# Patient Record
Sex: FEMALE | Race: BLACK OR AFRICAN AMERICAN | NOT HISPANIC OR LATINO | Employment: FULL TIME | URBAN - METROPOLITAN AREA
[De-identification: names, ages, dates, MRNs, and addresses within clinical notes are randomized per-mention and may not be internally consistent; named-entity substitution may affect disease eponyms.]

---

## 2017-01-17 ENCOUNTER — HOSPITAL ENCOUNTER (OUTPATIENT)
Dept: RADIOLOGY | Facility: HOSPITAL | Age: 57
Discharge: HOME | End: 2017-01-17
Payer: COMMERCIAL

## 2017-01-17 DIAGNOSIS — Z12.31 ENCOUNTER FOR SCREENING MAMMOGRAM FOR MALIGNANT NEOPLASM OF BREAST: ICD-10-CM

## 2017-01-17 PROCEDURE — G0202 SCR MAMMO BI INCL CAD: HCPCS

## 2017-03-15 ENCOUNTER — APPOINTMENT (EMERGENCY)
Dept: RADIOLOGY | Facility: HOSPITAL | Age: 57
End: 2017-03-15
Payer: COMMERCIAL

## 2017-03-15 ENCOUNTER — HOSPITAL ENCOUNTER (EMERGENCY)
Facility: HOSPITAL | Age: 57
Discharge: HOME | End: 2017-03-15
Admitting: EMERGENCY MEDICINE
Payer: COMMERCIAL

## 2017-03-15 VITALS
RESPIRATION RATE: 18 BRPM | DIASTOLIC BLOOD PRESSURE: 86 MMHG | WEIGHT: 195 LBS | TEMPERATURE: 99 F | OXYGEN SATURATION: 99 % | HEART RATE: 76 BPM | SYSTOLIC BLOOD PRESSURE: 176 MMHG

## 2017-03-15 DIAGNOSIS — S62.609A FINGER FRACTURE, LEFT, CLOSED, INITIAL ENCOUNTER: Primary | ICD-10-CM

## 2017-03-15 PROCEDURE — 73140 X-RAY EXAM OF FINGER(S): CPT

## 2017-03-15 PROCEDURE — 99283 EMERGENCY DEPT VISIT LOW MDM: CPT

## 2017-03-15 PROCEDURE — A9270 NON-COVERED ITEM OR SERVICE: HCPCS | Performed by: PHYSICIAN ASSISTANT

## 2017-03-15 RX ORDER — IBUPROFEN 400 MG/1
400 TABLET ORAL ONCE
Status: COMPLETED | OUTPATIENT
Start: 2017-03-15 | End: 2017-03-15

## 2017-03-15 RX ADMIN — IBUPROFEN 400 MG: 400 TABLET ORAL at 13:14

## 2017-10-25 ENCOUNTER — TRANSCRIBE ORDERS (OUTPATIENT)
Dept: ADMINISTRATIVE | Facility: HOSPITAL | Age: 57
End: 2017-10-25

## 2017-10-25 DIAGNOSIS — G89.29 CHRONIC PAIN OF RIGHT KNEE: Primary | ICD-10-CM

## 2017-10-25 DIAGNOSIS — G89.29 CHRONIC PAIN OF BOTH KNEES: ICD-10-CM

## 2017-10-25 DIAGNOSIS — M25.561 CHRONIC PAIN OF BOTH KNEES: ICD-10-CM

## 2017-10-25 DIAGNOSIS — M25.562 CHRONIC PAIN OF BOTH KNEES: ICD-10-CM

## 2017-10-25 DIAGNOSIS — M25.561 CHRONIC PAIN OF RIGHT KNEE: Primary | ICD-10-CM

## 2018-03-19 ENCOUNTER — TRANSCRIBE ORDERS (OUTPATIENT)
Dept: ADMINISTRATIVE | Facility: HOSPITAL | Age: 58
End: 2018-03-19

## 2018-03-19 DIAGNOSIS — Z12.31 ENCOUNTER FOR SCREENING MAMMOGRAM FOR MALIGNANT NEOPLASM OF BREAST: Primary | ICD-10-CM

## 2018-03-26 ENCOUNTER — HOSPITAL ENCOUNTER (OUTPATIENT)
Dept: RADIOLOGY | Facility: HOSPITAL | Age: 58
Discharge: HOME | End: 2018-03-26
Payer: COMMERCIAL

## 2018-03-26 DIAGNOSIS — Z12.31 ENCOUNTER FOR SCREENING MAMMOGRAM FOR MALIGNANT NEOPLASM OF BREAST: ICD-10-CM

## 2018-03-26 PROCEDURE — 77067 SCR MAMMO BI INCL CAD: CPT

## 2018-04-16 ENCOUNTER — HOSPITAL ENCOUNTER (OUTPATIENT)
Dept: RADIOLOGY | Facility: HOSPITAL | Age: 58
Discharge: HOME | End: 2018-04-16
Payer: COMMERCIAL

## 2018-04-16 ENCOUNTER — TRANSCRIBE ORDERS (OUTPATIENT)
Dept: ADMINISTRATIVE | Facility: HOSPITAL | Age: 58
End: 2018-04-16

## 2018-04-16 DIAGNOSIS — R07.89 OTHER CHEST PAIN: Primary | ICD-10-CM

## 2018-04-16 DIAGNOSIS — R07.89 OTHER CHEST PAIN: ICD-10-CM

## 2018-04-16 PROCEDURE — 71046 X-RAY EXAM CHEST 2 VIEWS: CPT

## 2019-03-19 ENCOUNTER — TRANSCRIBE ORDERS (OUTPATIENT)
Dept: ADMINISTRATIVE | Facility: HOSPITAL | Age: 59
End: 2019-03-19

## 2019-03-19 DIAGNOSIS — Z12.39 SCREENING BREAST EXAMINATION: Primary | ICD-10-CM

## 2019-04-04 ENCOUNTER — HOSPITAL ENCOUNTER (OUTPATIENT)
Dept: RADIOLOGY | Facility: HOSPITAL | Age: 59
Discharge: HOME | End: 2019-04-04
Payer: COMMERCIAL

## 2019-04-04 VITALS — BODY MASS INDEX: 31.58 KG/M2 | HEIGHT: 64 IN | WEIGHT: 185 LBS

## 2019-04-04 DIAGNOSIS — Z12.39 SCREENING BREAST EXAMINATION: ICD-10-CM

## 2019-04-04 PROCEDURE — 77067 SCR MAMMO BI INCL CAD: CPT

## 2019-06-22 LAB — HBA1C MFR BLD HPLC: 8.1 %

## 2019-09-09 RX ORDER — SIMVASTATIN 80 MG
TABLET ORAL
COMMUNITY
End: 2019-12-10

## 2019-09-09 RX ORDER — LISINOPRIL 30 MG/1
TABLET ORAL
COMMUNITY

## 2019-09-09 RX ORDER — IBUPROFEN 800 MG/1
TABLET ORAL AS NEEDED
COMMUNITY

## 2019-09-10 ENCOUNTER — TELEPHONE (OUTPATIENT)
Dept: ENDOCRINOLOGY | Facility: CLINIC | Age: 59
End: 2019-09-10

## 2019-09-10 NOTE — TELEPHONE ENCOUNTER
Called patient and requested she contact her primary care physician for chart notes or recent labs    Anything that can tell us of the previous care she has been receiving

## 2019-09-11 RX ORDER — RABEPRAZOLE SODIUM 20 MG/1
20 TABLET, DELAYED RELEASE ORAL DAILY
Refills: 0 | COMMUNITY
Start: 2019-09-09 | End: 2019-12-10

## 2019-09-11 RX ORDER — LISINOPRIL 20 MG/1
20 TABLET ORAL DAILY
Refills: 0 | COMMUNITY
Start: 2019-09-09

## 2019-09-11 RX ORDER — SIMVASTATIN 20 MG
TABLET ORAL
Refills: 0 | COMMUNITY
Start: 2019-09-07 | End: 2020-05-13

## 2019-09-11 RX ORDER — SITAGLIPTIN 100 MG/1
TABLET, FILM COATED ORAL
Refills: 0 | COMMUNITY
Start: 2019-09-07 | End: 2019-10-15 | Stop reason: SDUPTHER

## 2019-09-11 RX ORDER — FLUCONAZOLE 150 MG/1
150 TABLET ORAL DAILY
Refills: 0 | COMMUNITY
Start: 2019-06-18

## 2019-09-11 RX ORDER — INSULIN GLARGINE 100 [IU]/ML
INJECTION, SOLUTION SUBCUTANEOUS
Refills: 0 | COMMUNITY
Start: 2019-08-30 | End: 2019-09-13

## 2019-09-11 RX ORDER — BUPROPION HYDROCHLORIDE 150 MG/1
150 TABLET, EXTENDED RELEASE ORAL DAILY
Refills: 2 | COMMUNITY
Start: 2019-07-24

## 2019-09-11 RX ORDER — FLUCONAZOLE 200 MG/1
TABLET ORAL
Refills: 0 | COMMUNITY
Start: 2019-09-05

## 2019-09-13 ENCOUNTER — OFFICE VISIT (OUTPATIENT)
Dept: ENDOCRINOLOGY | Facility: CLINIC | Age: 59
End: 2019-09-13
Payer: COMMERCIAL

## 2019-09-13 VITALS
BODY MASS INDEX: 31.76 KG/M2 | HEART RATE: 69 BPM | SYSTOLIC BLOOD PRESSURE: 118 MMHG | DIASTOLIC BLOOD PRESSURE: 72 MMHG | WEIGHT: 186 LBS | HEIGHT: 64 IN

## 2019-09-13 DIAGNOSIS — E55.9 VITAMIN D DEFICIENCY: ICD-10-CM

## 2019-09-13 DIAGNOSIS — I10 HYPERTENSION GOAL BP (BLOOD PRESSURE) < 140/90: ICD-10-CM

## 2019-09-13 DIAGNOSIS — E11.9 TYPE 2 DIABETES MELLITUS WITHOUT COMPLICATION, WITH LONG-TERM CURRENT USE OF INSULIN (HCC): Primary | ICD-10-CM

## 2019-09-13 DIAGNOSIS — E05.90 SUBCLINICAL HYPERTHYROIDISM: ICD-10-CM

## 2019-09-13 DIAGNOSIS — Z79.4 TYPE 2 DIABETES MELLITUS WITHOUT COMPLICATION, WITH LONG-TERM CURRENT USE OF INSULIN (HCC): Primary | ICD-10-CM

## 2019-09-13 DIAGNOSIS — E78.00 PURE HYPERCHOLESTEROLEMIA: ICD-10-CM

## 2019-09-13 PROCEDURE — 99204 OFFICE O/P NEW MOD 45 MIN: CPT | Performed by: INTERNAL MEDICINE

## 2019-09-13 RX ORDER — ESTRADIOL 4 UG/1
INSERT VAGINAL
COMMUNITY
Start: 2019-09-09

## 2019-09-13 RX ORDER — GLIMEPIRIDE 2 MG/1
TABLET ORAL
Qty: 270 TABLET | Refills: 3 | Status: SHIPPED | OUTPATIENT
Start: 2019-09-13 | End: 2019-10-17 | Stop reason: SDUPTHER

## 2019-09-13 RX ORDER — MELATONIN
1000 DAILY
Qty: 90 TABLET | Refills: 3 | Status: SHIPPED | OUTPATIENT
Start: 2019-09-13 | End: 2020-09-25

## 2019-09-13 NOTE — PROGRESS NOTES
ENDOCRINOLOGY  NEW PATIENT H&P     ? Reason for Endocrine Consult/Chief Complaint: DM management          Medical Decision Making:     Impression  1  DM2 uncontrolled  2  HTN  3  HLD   4  Vitamin D deficiency  5  Low TSH    Recommendations:  ?  I discussed the pathophysiology of DM2 and went over therapy options based on ADA 2019 guidelines  Her A1C is 8% and close to getting at goal of <7%  She presently is on basal insulin along with a sulfonylurea and DPP-4 inhibitor  Her fasting BGs are mildly elevated but she is having some post-prandial spikes into the 200s especially after dinner which is her largest meal   I discussed use of GLP-1 agonists however she has had nausea before and stomach issues and does not want to pursue that therapy  I increased her lantus in AM to 23 units to improve her FBG numbers and also increased her glimepiride to 2mg qAM with breakfast and 4mg qPM with dinner to diminish post-prandial spikes in BG after dinner  Will check c-peptide to assess for endogenous insulin production  Instructed to send me BG log in 2 weeks for review and to notify me if BGs are running on lo side 70-90 or she has an episode of low BG <70 so I can adjust therapy  Referred to [facility] CDE for further diabetes education  HTN- controlled on ACEi    HLD- continue simvastatin, repeat lipids now to assess for whether she needs higher potency statin    Vitamin D deficiency- start D3 replacement 1000 IU daily, repeat level now    Low TSH- subclinical hyperthyroidism on labs in June 2019, TSH mildly low, will repeat TFTs to assess if persistent, has had some weight loss recently     Elevated WBC and Hgb- on labs in June 2019, had UTI at that time now resolved, repeat CBC w/diff to see if persistent     RTC in 4 weeks     Josephine ARANGO  History of Present Illness:   Mrs Gill Bains is a 54yr old female who presents for DM management  She had most recent labs in June 2019    She had elevated WBC and Hgb as well as A1C of 8 1% and low TSH of 0 444, normal FT4  Her Vit D level was low as well at 23 9  GFR of 94  PMH-DM2, HLD, HTN, vitamin D deficiency   PSH-shoulder surgery   FHx-DM in brothers and sister   SHx-+smoking 1ppd, social ETOH use, retired [employer] supervisor      Type of DM: 2  Age of onset:6 years hospitalized at [facility]   Most recent A1C:8 1% June 2019  Present home regimen: Januvia 100mg qday, lantus 20 units qAM, glimepiride 2mg BID AC --- had metformin in the past but had SE of loose BMs and nausea   SMBG at home: FBG in low to mid 100s and prelunch in high 100s/200s, 2hrs after lunch in same range and pre-dinner in high 100s and qHS in high 100s and 200s   Hypoglycemic events: none   Microvascular complications:none known   Macrovascular complications: none known   Nutrition: 3 meals a day, breakfast grits with cheese, lunch white castle fish sandwich with fries, dinner portion yellow rice and string beans and meat, some juices/sodas   Exercise: walking at park      ? Review of Systems:     Review of Systems   Constitutional: Positive for appetite change  HENT: Negative  Eyes: Negative  Respiratory: Negative  Cardiovascular: Negative  Gastrointestinal: Negative  Endocrine: Negative  Genitourinary: Negative  Musculoskeletal: Negative  Skin: Negative  Allergic/Immunologic: Negative  Neurological: Negative  Hematological: Negative  Psychiatric/Behavioral: Negative  ? Patient History:     Past Medical History:   Diagnosis Date    Diabetes mellitus ([address] )     Hyperlipidemia     Hypertension      No past surgical history on file  Social History     Socioeconomic History    Marital status:       Spouse name: Not on file    Number of children: Not on file    Years of education: Not on file    Highest education level: Not on file   Occupational History    Not on file   Social Needs    Financial resource strain: Not on file    Food insecurity:     Worry: Not on file     Inability: Not on file    Transportation needs:     Medical: Not on file     Non-medical: Not on file   Tobacco Use    Smoking status: Current Every Day Smoker     Packs/day: 0 50     Types: Cigarettes   Substance and Sexual Activity    Alcohol use: Yes     Comment: socially    Drug use: No    Sexual activity: Not on file   Lifestyle    Physical activity:     Days per week: Not on file     Minutes per session: Not on file    Stress: Not on file   Relationships    Social connections:     Talks on phone: Not on file     Gets together: Not on file     Attends Cheondoism service: Not on file     Active member of club or organization: Not on file     Attends meetings of clubs or organizations: Not on file     Relationship status: Not on file    Intimate partner violence:     Fear of current or ex partner: Not on file     Emotionally abused: Not on file     Physically abused: Not on file     Forced sexual activity: Not on file   Other Topics Concern    Not on file   Social History Narrative    Not on file     Family History   Problem Relation Age of Onset    Lung cancer Father 52    Endometrial cancer Sister 61       Current Medications: At the time this note was written these were the medications the patient was on    Current Outpatient Medications   Medication Sig Dispense Refill    buPROPion (ZYBAN) 150 MG 12 hr tablet Take 150 mg by mouth daily  2    fluconazole (DIFLUCAN) 150 mg tablet Take 150 mg by mouth daily  0    fluconazole (DIFLUCAN) 200 mg tablet TAKE 1 TABLET BY MOUTH ONE TIME  0    ibuprofen (MOTRIN) 800 mg tablet Take by mouth      IMVEXXY MAINTENANCE PACK 4 MCG INST       JANUVIA 100 MG tablet   0    LANTUS SOLOSTAR 100 units/mL injection pen INJECT 20 UNITS SUBQ ONCE A DAY  0    lisinopril (ZESTRIL) 20 mg tablet Take 20 mg by mouth daily  0    lisinopril (ZESTRIL) 30 mg tablet Take by mouth      LISINOPRIL PO Take by mouth      PROAIR  (90 Base) MCG/ACT inhaler Inhale 2 puffs 2 (two) times a day  2    RABEprazole (ACIPHEX) 20 MG tablet Take 20 mg by mouth daily  0    simvastatin (ZOCOR) 20 mg tablet   0    simvastatin (ZOCOR) 80 mg tablet Take by mouth      SIMVASTATIN PO Take by mouth      SITagliptin Phosphate (JANUVIA PO) Take by mouth      SITagliptin-metFORMIN HCl ER (JANUMET XR) 100-1000 MG TB24 Take by mouth       No current facility-administered medications for this visit  Allergies: Patient has no known allergies  Physical Exam:   Vital Signs:   /72   Pulse 69   Ht 5' 4" (1 626 m)   Wt 84 4 kg (186 lb)   BMI 31 93 kg/m²     Physical Exam   Constitutional: She is oriented to person, place, and time  She appears well-developed and well-nourished  HENT:   Head: Normocephalic and atraumatic  Nose: Nose normal    Mouth/Throat: Oropharynx is clear and moist  No oropharyngeal exudate  Eyes: Pupils are equal, round, and reactive to light  Conjunctivae and EOM are normal    Neck: Normal range of motion  Neck supple  No thyromegaly present  Cardiovascular: Normal rate, regular rhythm and normal heart sounds  Exam reveals no gallop and no friction rub  No murmur heard  Pulmonary/Chest: Effort normal and breath sounds normal  No stridor  No respiratory distress  She has no wheezes  She has no rales  Abdominal: Soft  Bowel sounds are normal  She exhibits no distension and no mass  There is no tenderness  There is no rebound and no guarding  Musculoskeletal: Normal range of motion  She exhibits no edema  Lymphadenopathy:     She has no cervical adenopathy  Neurological: She is alert and oriented to person, place, and time  Skin: Skin is warm and dry  No rash noted  No erythema  Psychiatric: She has a normal mood and affect   Her behavior is normal  Judgment and thought content normal           Labs and Imaging:      ?labs from June 2019 scanned into media

## 2019-09-13 NOTE — PATIENT INSTRUCTIONS
Increase lantus to 23 units in the morning  Continue Januvia 100mg qday  Increase glimepiride to 2mg with breakfast and 4mg with dinner    Have labs done next week, I will call with the results    Send me blood sugar log in 2 weeks    Meet with Gigi Barriga our diabetes educator    Follow up in 4 weeks

## 2019-09-13 NOTE — LETTER
September 13, 2019     Emeli Szymanski MD  [facility]    Patient: Danny Wan   YOB: 1960   Date of Visit: 9/13/2019       Dear Dr Timmy Perez: Thank you for referring Danny Wan to me for evaluation  Below are my notes for this consultation  If you have questions, please do not hesitate to call me  I look forward to following your patient along with you  Sincerely,        Nohemi Lazcano MD        CC: No Recipients  Nohemi Lazcano MD  9/13/2019 10:04 AM  Incomplete  ENDOCRINOLOGY  NEW PATIENT H&P     ? Reason for Endocrine Consult/Chief Complaint: DM management          Medical Decision Making:     Impression  1  DM2 uncontrolled  2  HTN  3  HLD   4  Vitamin D deficiency  5  Low TSH    Recommendations:  ?  I discussed the pathophysiology of DM2 and went over therapy options based on ADA 2019 guidelines  Her A1C is 8% and close to getting at goal of <7%  She presently is on basal insulin along with a sulfonylurea and DPP-4 inhibitor  Her fasting BGs are mildly elevated but she is having some post-prandial spikes into the 200s especially after dinner which is her largest meal   I discussed use of GLP-1 agonists however she has had nausea before and stomach issues and does not want to pursue that therapy  I increased her lantus in AM to 23 units to improve her FBG numbers and also increased her glimepiride to 2mg qAM with breakfast and 4mg qPM with dinner to diminish post-prandial spikes in BG after dinner  Will check c-peptide to assess for endogenous insulin production  Instructed to send me BG log in 2 weeks for review and to notify me if BGs are running on lo side 70-90 or she has an episode of low BG <70 so I can adjust therapy  Referred to [facility] CDE for further diabetes education       HTN- controlled on ACEi    HLD- continue simvastatin, repeat lipids now to assess for whether she needs higher potency statin    Vitamin D deficiency- start D3 replacement 1000 IU daily, repeat level now    Low TSH- subclinical hyperthyroidism on labs in June 2019, TSH mildly low, will repeat TFTs to assess if persistent, has had some weight loss recently     Elevated WBC and Hgb- on labs in June 2019, had UTI at that time now resolved, repeat CBC w/diff to see if persistent     RTC in 4 weeks     Oscar ARANGO  History of Present Illness:    Mrs Joe Birmingham is a 54yr old female who presents for DM management  She had most recent labs in June 2019  She had elevated WBC and Hgb as well as A1C of 8 1% and low TSH of 0 444, normal FT4  Her Vit D level was low as well at 23 9  GFR of 94  PMH-DM2, HLD, HTN, vitamin D deficiency   PSH-shoulder surgery   FHx-DM in brothers and sister   SHx-+smoking 1ppd, social ETOH use, retired [employer] supervisor      Type of DM: 2  Age of onset:6 years hospitalized at [facility]   Most recent A1C:8 1% June 2019  Present home regimen: Januvia 100mg qday, lantus 20 units qAM, glimepiride 2mg BID AC --- had metformin in the past but had SE of loose BMs and nausea   SMBG at home: FBG in low to mid 100s and prelunch in high 100s/200s, 2hrs after lunch in same range and pre-dinner in high 100s and qHS in high 100s and 200s   Hypoglycemic events: none   Microvascular complications:none known   Macrovascular complications: none known   Nutrition: 3 meals a day, breakfast grits with cheese, lunch white castle fish sandwich with fries, dinner portion yellow rice and string beans and meat, some juices/sodas   Exercise: walking at park      ? Review of Systems:     Review of Systems   Constitutional: Positive for appetite change  HENT: Negative  Eyes: Negative  Respiratory: Negative  Cardiovascular: Negative  Gastrointestinal: Negative  Endocrine: Negative  Genitourinary: Negative  Musculoskeletal: Negative  Skin: Negative  Allergic/Immunologic: Negative  Neurological: Negative  Hematological: Negative  Psychiatric/Behavioral: Negative  ? Patient History:     Past Medical History:   Diagnosis Date    Diabetes mellitus [address] [address] )     Hyperlipidemia     Hypertension      No past surgical history on file  Social History     Socioeconomic History    Marital status:      Spouse name: Not on file    Number of children: Not on file    Years of education: Not on file    Highest education level: Not on file   Occupational History    Not on file   Social Needs    Financial resource strain: Not on file    Food insecurity:     Worry: Not on file     Inability: Not on file    Transportation needs:     Medical: Not on file     Non-medical: Not on file   Tobacco Use    Smoking status: Current Every Day Smoker     Packs/day: 0 50     Types: Cigarettes   Substance and Sexual Activity    Alcohol use: Yes     Comment: socially    Drug use: No    Sexual activity: Not on file   Lifestyle    Physical activity:     Days per week: Not on file     Minutes per session: Not on file    Stress: Not on file   Relationships    Social connections:     Talks on phone: Not on file     Gets together: Not on file     Attends Hoahaoism service: Not on file     Active member of club or organization: Not on file     Attends meetings of clubs or organizations: Not on file     Relationship status: Not on file    Intimate partner violence:     Fear of current or ex partner: Not on file     Emotionally abused: Not on file     Physically abused: Not on file     Forced sexual activity: Not on file   Other Topics Concern    Not on file   Social History Narrative    Not on file     Family History   Problem Relation Age of Onset    Lung cancer Father 52    Endometrial cancer Sister 61       Current Medications: At the time this note was written these were the medications the patient was on    Current Outpatient Medications   Medication Sig Dispense Refill    buPROPion (ZYBAN) 150 MG 12 hr tablet Take 150 mg by mouth daily  2    fluconazole (DIFLUCAN) 150 mg tablet Take 150 mg by mouth daily  0    fluconazole (DIFLUCAN) 200 mg tablet TAKE 1 TABLET BY MOUTH ONE TIME  0    ibuprofen (MOTRIN) 800 mg tablet Take by mouth      IMVEXXY MAINTENANCE PACK 4 MCG INST       JANUVIA 100 MG tablet   0    LANTUS SOLOSTAR 100 units/mL injection pen INJECT 20 UNITS SUBQ ONCE A DAY  0    lisinopril (ZESTRIL) 20 mg tablet Take 20 mg by mouth daily  0    lisinopril (ZESTRIL) 30 mg tablet Take by mouth      LISINOPRIL PO Take by mouth      PROAIR  (90 Base) MCG/ACT inhaler Inhale 2 puffs 2 (two) times a day  2    RABEprazole (ACIPHEX) 20 MG tablet Take 20 mg by mouth daily  0    simvastatin (ZOCOR) 20 mg tablet   0    simvastatin (ZOCOR) 80 mg tablet Take by mouth      SIMVASTATIN PO Take by mouth      SITagliptin Phosphate (JANUVIA PO) Take by mouth      SITagliptin-metFORMIN HCl ER (JANUMET XR) 100-1000 MG TB24 Take by mouth       No current facility-administered medications for this visit  Allergies: Patient has no known allergies  Physical Exam:   Vital Signs:   /72   Pulse 69   Ht 5' 4" (1 626 m)   Wt 84 4 kg (186 lb)   BMI 31 93 kg/m²      Physical Exam   Constitutional: She is oriented to person, place, and time  She appears well-developed and well-nourished  HENT:   Head: Normocephalic and atraumatic  Nose: Nose normal    Mouth/Throat: Oropharynx is clear and moist  No oropharyngeal exudate  Eyes: Pupils are equal, round, and reactive to light  Conjunctivae and EOM are normal    Neck: Normal range of motion  Neck supple  No thyromegaly present  Cardiovascular: Normal rate, regular rhythm and normal heart sounds  Exam reveals no gallop and no friction rub  No murmur heard  Pulmonary/Chest: Effort normal and breath sounds normal  No stridor  No respiratory distress  She has no wheezes  She has no rales  Abdominal: Soft   Bowel sounds are normal  She exhibits no distension and no mass  There is no tenderness  There is no rebound and no guarding  Musculoskeletal: Normal range of motion  She exhibits no edema  Lymphadenopathy:     She has no cervical adenopathy  Neurological: She is alert and oriented to person, place, and time  Skin: Skin is warm and dry  No rash noted  No erythema  Psychiatric: She has a normal mood and affect  Her behavior is normal  Judgment and thought content normal           Labs and Imaging:      ?labs from June 2019 scanned into Yair Lowry MD  9/13/2019  9:50 AM  Incomplete  ENDOCRINOLOGY  NEW PATIENT H&P     ? Reason for Endocrine Consult/Chief Complaint: DM management          Medical Decision Making:     Impression  1  DM2 uncontrolled  2  HTN  3  HLD       Recommendations:  ?      Ashley ARANGO  History of Present Illness:    Mrs Rivka Amador is a 54yr old female who presents for DM management  She had most recent labs in June 2019  She had elevated WBC and Hgb as well as A1C of 8 1% and low TSH of 0 444, normal FT4  Her Vit D level was low as well at 23 9  GFR of 94       PMH-DM2, HLD, HTN, vitamin D deficiency   PSH-shoulder surgery   FHx-DM in brothers and sister   SHx-+smoking 1ppd, social ETOH use, retired [employer] supervisor      Type of DM: 2  Age of onset:6 years hospitalized at [facility]   Most recent A1C:8 1% June 2019  Present home regimen: Januvia 100mg qday, lantus 20 units qAM, glimepiride 2mg BID AC --- had metformin in the past but had SE of loose BMs and nausea   SMBG at home: FBG in low to mid 100s and prelunch in high 100s/200s, 2hrs after lunch in same range and pre-dinner in high 100s and qHS in high 100s and 200s   Hypoglycemic events: none   Microvascular complications:none known   Macrovascular complications: none known   Nutrition: 3 meals a day, breakfast grits with cheese, lunch white castle fish sandwich with fries, dinner portion yellow rice and string beans and meat, some juices/sodas   Exercise: walking at park      ? Review of Systems:     Review of Systems   ? Patient History:     Past Medical History:   Diagnosis Date    Diabetes mellitus [address] [address]     Hyperlipidemia     Hypertension      No past surgical history on file  Social History     Socioeconomic History    Marital status:      Spouse name: Not on file    Number of children: Not on file    Years of education: Not on file    Highest education level: Not on file   Occupational History    Not on file   Social Needs    Financial resource strain: Not on file    Food insecurity:     Worry: Not on file     Inability: Not on file    Transportation needs:     Medical: Not on file     Non-medical: Not on file   Tobacco Use    Smoking status: Current Every Day Smoker     Packs/day: 0 50     Types: Cigarettes   Substance and Sexual Activity    Alcohol use: Yes     Comment: socially    Drug use: No    Sexual activity: Not on file   Lifestyle    Physical activity:     Days per week: Not on file     Minutes per session: Not on file    Stress: Not on file   Relationships    Social connections:     Talks on phone: Not on file     Gets together: Not on file     Attends [employer] service: Not on file     Active member of club or organization: Not on file     Attends meetings of clubs or organizations: Not on file     Relationship status: Not on file    Intimate partner violence:     Fear of current or ex partner: Not on file     Emotionally abused: Not on file     Physically abused: Not on file     Forced sexual activity: Not on file   Other Topics Concern    Not on file   Social History Narrative    Not on file     Family History   Problem Relation Age of Onset    Lung cancer Father 52    Endometrial cancer Sister 61       Current Medications: At the time this note was written these were the medications the patient was on    Current Outpatient Medications   Medication Sig Dispense Refill    buPROPion (ZYBAN) 150 MG 12 hr tablet Take 150 mg by mouth daily  2    fluconazole (DIFLUCAN) 150 mg tablet Take 150 mg by mouth daily  0    fluconazole (DIFLUCAN) 200 mg tablet TAKE 1 TABLET BY MOUTH ONE TIME  0    ibuprofen (MOTRIN) 800 mg tablet Take by mouth      IMVEXXY MAINTENANCE PACK 4 MCG INST       JANUVIA 100 MG tablet   0    LANTUS SOLOSTAR 100 units/mL injection pen INJECT 20 UNITS SUBQ ONCE A DAY  0    lisinopril (ZESTRIL) 20 mg tablet Take 20 mg by mouth daily  0    lisinopril (ZESTRIL) 30 mg tablet Take by mouth      LISINOPRIL PO Take by mouth      PROAIR  (90 Base) MCG/ACT inhaler Inhale 2 puffs 2 (two) times a day  2    RABEprazole (ACIPHEX) 20 MG tablet Take 20 mg by mouth daily  0    simvastatin (ZOCOR) 20 mg tablet   0    simvastatin (ZOCOR) 80 mg tablet Take by mouth      SIMVASTATIN PO Take by mouth      SITagliptin Phosphate (JANUVIA PO) Take by mouth      SITagliptin-metFORMIN HCl ER (JANUMET XR) 100-1000 MG TB24 Take by mouth       No current facility-administered medications for this visit  Allergies: Patient has no known allergies      Physical Exam:   Vital Signs:   /72   Pulse 69   Ht 5' 4" (1 626 m)   Wt 84 4 kg (186 lb)   BMI 31 93 kg/m²      Physical Exam       Labs and Imaging:      ?labs from June 2019 scanned into media

## 2019-09-18 LAB
CREAT ?TM UR-SCNC: 104.5 UMOL/L
EXT MICROALBUMIN URINE RANDOM: 13.5
HBA1C MFR BLD HPLC: 10.1 %
MICROALBUMIN/CREAT UR: 12.9 MG/G{CREAT}

## 2019-09-20 LAB
25(OH)D3+25(OH)D2 SERPL-MCNC: 25.2 NG/ML (ref 30–100)
ALBUMIN SERPL-MCNC: 4.1 G/DL (ref 3.6–4.8)
ALBUMIN/CREAT UR: 12.9 MG/G CREAT (ref 0–30)
ALBUMIN/GLOB SERPL: 1.7 {RATIO} (ref 1.2–2.2)
ALP SERPL-CCNC: 70 IU/L (ref 39–117)
ALT SERPL-CCNC: 15 IU/L (ref 0–32)
AST SERPL-CCNC: 15 IU/L (ref 0–40)
BASOPHILS # BLD AUTO: 0 X10E3/UL (ref 0–0.2)
BASOPHILS NFR BLD AUTO: 0 %
BILIRUB SERPL-MCNC: 0.5 MG/DL (ref 0–1.2)
BUN SERPL-MCNC: 9 MG/DL (ref 8–27)
BUN/CREAT SERPL: 12 (ref 12–28)
C PEPTIDE SERPL-MCNC: 1.9 NG/ML (ref 1.1–4.4)
CALCIUM SERPL-MCNC: 9.6 MG/DL (ref 8.7–10.3)
CHLORIDE SERPL-SCNC: 106 MMOL/L (ref 96–106)
CHOLEST SERPL-MCNC: 183 MG/DL (ref 100–199)
CO2 SERPL-SCNC: 23 MMOL/L (ref 20–29)
CREAT SERPL-MCNC: 0.73 MG/DL (ref 0.57–1)
CREAT UR-MCNC: 104.5 MG/DL
EOSINOPHIL # BLD AUTO: 0.2 X10E3/UL (ref 0–0.4)
EOSINOPHIL NFR BLD AUTO: 2 %
ERYTHROCYTE [DISTWIDTH] IN BLOOD BY AUTOMATED COUNT: 14.4 % (ref 12.3–15.4)
GAD65 AB SER-ACNC: <5 U/ML (ref 0–5)
GLOBULIN SER-MCNC: 2.4 G/DL (ref 1.5–4.5)
GLUCOSE SERPL-MCNC: 98 MG/DL (ref 65–99)
HBA1C MFR BLD: 10.1 % (ref 4.8–5.6)
HCT VFR BLD AUTO: 44.9 % (ref 34–46.6)
HDLC SERPL-MCNC: 37 MG/DL
HGB BLD-MCNC: 15.2 G/DL (ref 11.1–15.9)
IMM GRANULOCYTES # BLD: 0 X10E3/UL (ref 0–0.1)
IMM GRANULOCYTES NFR BLD: 0 %
LDLC SERPL CALC-MCNC: 125 MG/DL (ref 0–99)
LYMPHOCYTES # BLD AUTO: 2.7 X10E3/UL (ref 0.7–3.1)
LYMPHOCYTES NFR BLD AUTO: 26 %
MCH RBC QN AUTO: 28.7 PG (ref 26.6–33)
MCHC RBC AUTO-ENTMCNC: 33.9 G/DL (ref 31.5–35.7)
MCV RBC AUTO: 85 FL (ref 79–97)
MICROALBUMIN UR-MCNC: 13.5 UG/ML
MONOCYTES # BLD AUTO: 0.7 X10E3/UL (ref 0.1–0.9)
MONOCYTES NFR BLD AUTO: 6 %
NEUTROPHILS # BLD AUTO: 6.8 X10E3/UL (ref 1.4–7)
NEUTROPHILS NFR BLD AUTO: 66 %
PLATELET # BLD AUTO: 272 X10E3/UL (ref 150–450)
POTASSIUM SERPL-SCNC: 3.8 MMOL/L (ref 3.5–5.2)
PROT SERPL-MCNC: 6.5 G/DL (ref 6–8.5)
RBC # BLD AUTO: 5.3 X10E6/UL (ref 3.77–5.28)
SL AMB EGFR AFRICAN AMERICAN: 103 ML/MIN/1.73
SL AMB EGFR NON AFRICAN AMERICAN: 89 ML/MIN/1.73
SL AMB VLDL CHOLESTEROL CALC: 21 MG/DL (ref 5–40)
SODIUM SERPL-SCNC: 144 MMOL/L (ref 134–144)
T3 SERPL-MCNC: 114 NG/DL (ref 71–180)
T4 FREE SERPL-MCNC: 1.44 NG/DL (ref 0.82–1.77)
THYROPEROXIDASE AB SERPL-ACNC: 17 IU/ML (ref 0–34)
TRIGL SERPL-MCNC: 107 MG/DL (ref 0–149)
TSH SERPL DL<=0.005 MIU/L-ACNC: 0.49 UIU/ML (ref 0.45–4.5)
TSI SER-ACNC: <0.1 IU/L (ref 0–0.55)
WBC # BLD AUTO: 10.4 X10E3/UL (ref 3.4–10.8)

## 2019-09-23 ENCOUNTER — TELEPHONE (OUTPATIENT)
Dept: ENDOCRINOLOGY | Facility: CLINIC | Age: 59
End: 2019-09-23

## 2019-09-23 DIAGNOSIS — E11.9 TYPE 2 DIABETES MELLITUS WITHOUT COMPLICATION, WITH LONG-TERM CURRENT USE OF INSULIN (HCC): Primary | ICD-10-CM

## 2019-09-23 DIAGNOSIS — E11.65 UNCONTROLLED TYPE 2 DIABETES MELLITUS WITH HYPERGLYCEMIA (HCC): ICD-10-CM

## 2019-09-23 DIAGNOSIS — Z79.4 TYPE 2 DIABETES MELLITUS WITHOUT COMPLICATION, WITH LONG-TERM CURRENT USE OF INSULIN (HCC): Primary | ICD-10-CM

## 2019-09-24 ENCOUNTER — TELEPHONE (OUTPATIENT)
Dept: ENDOCRINOLOGY | Facility: CLINIC | Age: 59
End: 2019-09-24

## 2019-09-24 DIAGNOSIS — E11.65 UNCONTROLLED TYPE 2 DIABETES MELLITUS WITH HYPERGLYCEMIA (HCC): ICD-10-CM

## 2019-09-24 NOTE — TELEPHONE ENCOUNTER
Called pt with most recent labs    A1C high 10 1%  LDL mildly elevated 125, on simvastatin 20mg qday states good compliance, will discuss increasing dose or changing to stronger potency  Vit D mildly low 25- started on 1000IU D3 daily    Instructed to send BG log in 1 week for review, she states overall BGs improved nothing over 300  Yahaira ARANGO    Endocrinology

## 2019-09-27 DIAGNOSIS — E78.00 PURE HYPERCHOLESTEROLEMIA: ICD-10-CM

## 2019-09-27 DIAGNOSIS — E11.9 TYPE 2 DIABETES MELLITUS WITHOUT COMPLICATION, WITH LONG-TERM CURRENT USE OF INSULIN (HCC): ICD-10-CM

## 2019-09-27 DIAGNOSIS — Z79.4 TYPE 2 DIABETES MELLITUS WITHOUT COMPLICATION, WITH LONG-TERM CURRENT USE OF INSULIN (HCC): ICD-10-CM

## 2019-09-27 DIAGNOSIS — E55.9 VITAMIN D DEFICIENCY: ICD-10-CM

## 2019-09-27 DIAGNOSIS — I10 HYPERTENSION GOAL BP (BLOOD PRESSURE) < 140/90: ICD-10-CM

## 2019-09-27 RX ORDER — LANCETS
EACH MISCELLANEOUS
Qty: 100 EACH | Refills: 3 | Status: SHIPPED | OUTPATIENT
Start: 2019-09-27 | End: 2020-01-29

## 2019-10-04 ENCOUNTER — TELEPHONE (OUTPATIENT)
Dept: ENDOCRINOLOGY | Facility: CLINIC | Age: 59
End: 2019-10-04

## 2019-10-04 NOTE — TELEPHONE ENCOUNTER
Called patient and reviewed BG log    FBG in low 100s, premeal and qHS in mid to high 100s, qHS occasionally low 200s    She did not write down low BGs on the log sheet but said occasionally getting low into 50s overnight or during day, has had poor appetite recently    Instructed if having low BG overnight or in morning reduce lantus to 20 units qAM (she was doing 23 units qAM)  If appetite is poor reduce amaryl to 1mg BID AC (she was doing 2mg BID AC)  Continue metformin as is     Instructed to call me if she continues to have low BG for therapy modification    Gurvinder ARANGO    Endocrinology

## 2019-10-15 ENCOUNTER — OFFICE VISIT (OUTPATIENT)
Dept: ENDOCRINOLOGY | Facility: CLINIC | Age: 59
End: 2019-10-15
Payer: COMMERCIAL

## 2019-10-15 ENCOUNTER — OFFICE VISIT (OUTPATIENT)
Dept: DIABETES SERVICES | Facility: CLINIC | Age: 59
End: 2019-10-15
Payer: COMMERCIAL

## 2019-10-15 VITALS
BODY MASS INDEX: 31.41 KG/M2 | WEIGHT: 184 LBS | DIASTOLIC BLOOD PRESSURE: 72 MMHG | HEIGHT: 64 IN | HEART RATE: 79 BPM | SYSTOLIC BLOOD PRESSURE: 116 MMHG

## 2019-10-15 DIAGNOSIS — E55.9 VITAMIN D DEFICIENCY: ICD-10-CM

## 2019-10-15 DIAGNOSIS — E11.65 TYPE 2 DIABETES MELLITUS WITH HYPERGLYCEMIA, WITHOUT LONG-TERM CURRENT USE OF INSULIN (HCC): Primary | ICD-10-CM

## 2019-10-15 DIAGNOSIS — E11.65 TYPE 2 DIABETES MELLITUS WITH HYPERGLYCEMIA, WITH LONG-TERM CURRENT USE OF INSULIN (HCC): Primary | ICD-10-CM

## 2019-10-15 DIAGNOSIS — I10 HYPERTENSION GOAL BP (BLOOD PRESSURE) < 140/90: ICD-10-CM

## 2019-10-15 DIAGNOSIS — Z79.4 TYPE 2 DIABETES MELLITUS WITH HYPERGLYCEMIA, WITH LONG-TERM CURRENT USE OF INSULIN (HCC): Primary | ICD-10-CM

## 2019-10-15 DIAGNOSIS — E78.00 PURE HYPERCHOLESTEROLEMIA: ICD-10-CM

## 2019-10-15 PROCEDURE — 3078F DIAST BP <80 MM HG: CPT | Performed by: INTERNAL MEDICINE

## 2019-10-15 PROCEDURE — 3074F SYST BP LT 130 MM HG: CPT | Performed by: INTERNAL MEDICINE

## 2019-10-15 PROCEDURE — 99214 OFFICE O/P EST MOD 30 MIN: CPT | Performed by: INTERNAL MEDICINE

## 2019-10-15 PROCEDURE — G0108 DIAB MANAGE TRN  PER INDIV: HCPCS | Performed by: DIETITIAN, REGISTERED

## 2019-10-15 RX ORDER — SITAGLIPTIN 100 MG/1
100 TABLET, FILM COATED ORAL DAILY
Qty: 90 TABLET | Refills: 3 | Status: SHIPPED | OUTPATIENT
Start: 2019-10-15 | End: 2019-10-17 | Stop reason: SDUPTHER

## 2019-10-15 NOTE — PATIENT INSTRUCTIONS
1  Start testing blood sugar before and two hours after different meals as shown  If blood sugar is above target, write what food you have eaten in the comment section of the blood sugar log   2  Check food labels for serving size and total carbohydrates per serving  If blood sugar is high 2 hours after eating, try eating less of the carb food or switch to a different food to see if that works better  3  Slowly start exercise program  Include at least 2-3 days of strength training

## 2019-10-15 NOTE — PROGRESS NOTES
ENDOCRINOLOGY  FOLLOW UP VISIT      Reason for Endocrine Consult/Chief Complaint: DM management           ? Medical Decision Making:     Impression  1  DM2 uncontrolled  2  HTN  3  HLD   4  Vitamin D deficiency  5  Low TSH- now resolved      Recommendations:  ?  Her reported blood sugars are fairly good with limited hyperglycemia  Continue lantus 20 units qHS and glimepiride 1mg qday and Januvia 100mg qday       Instructed to send me BG log in 2 weeks for review and to notify me if BGs are running on low side 70-90 or if she has an episode of low BG <70 so I can adjust therapy  She is having EGD later this month, instructed to decrease lantus the night before to 18 units qHS and not to take any glimepiride or januvia the morning of her procedure, resume normal regimen after EGD if appetite back to normal      HTN- controlled on ACEi     HLD- continue simvastatin, repeat lipids in Dec 2019 to assess for whether she needs higher potency statin if no improvement in LDL     Vitamin D deficiency- continue D3 replacement 1000 IU daily, repeat level in Dec 2019            RTC in 6 weeks    Aleksandra ARANGO            History of Present Illness:   Mrs Addison Smith is a 54yr old female who presents for DM management       PMH-DM2, HLD, HTN, vitamin D deficiency   PSH-shoulder surgery   FHx-DM in brothers and sister   SHx-+smoking 1ppd, social ETOH use, retired [employer] supervisor      Type of DM: 2  Age of onset:6 years hospitalized at [facility]   Most recent A1C:8 1% June 2019  Present home regimen: Januvia 100mg qday, lantus 20 units qAM, glimepiride 2mg BID AC --- had metformin in the past but had SE of loose BMs and nausea   Microvascular complications:none known   Macrovascular complications: none known     Events since last visit:   Presently on lantus 20 units qAM and amaryl 2mg qday    Forgot BG log, no higher than 220  FBG-80-110s  Premeal/qHS BG-no higher than 159   ?   Hypoglycemia- 56 overnight     Having EGD 22nd October   Review of Systems:   Review of Systems   Constitutional: Negative for appetite change, chills, diaphoresis, fatigue, fever and unexpected weight change  HENT: Negative for congestion, ear pain, hearing loss, rhinorrhea, sinus pressure, sinus pain, sore throat, trouble swallowing and voice change  Eyes: Negative for photophobia, redness and visual disturbance  Respiratory: Negative for apnea, cough, chest tightness, shortness of breath, wheezing and stridor  Cardiovascular: Negative for chest pain, palpitations and leg swelling  Gastrointestinal: Negative for abdominal distention, abdominal pain, constipation, diarrhea, nausea and vomiting  Endocrine: Negative for cold intolerance, heat intolerance, polydipsia, polyphagia and polyuria  Genitourinary: Negative for difficulty urinating, dysuria, flank pain, frequency, hematuria and urgency  Musculoskeletal: Negative for arthralgias, back pain, gait problem, joint swelling and myalgias  Skin: Negative for color change, pallor, rash and wound  Allergic/Immunologic: Negative for immunocompromised state  Neurological: Negative for dizziness, tremors, syncope, weakness, light-headedness and headaches  Hematological: Negative for adenopathy  Does not bruise/bleed easily  Psychiatric/Behavioral: Negative for confusion and sleep disturbance  The patient is not nervous/anxious  Patient History:     Past Medical History:   Diagnosis Date    Diabetes mellitus ([facility] [address] )     Hyperlipidemia     Hypertension      No past surgical history on file  Social History     Socioeconomic History    Marital status:       Spouse name: Not on file    Number of children: Not on file    Years of education: Not on file    Highest education level: Not on file   Occupational History    Not on file   Social Needs    Financial resource strain: Not on file    Food insecurity:     Worry: Not on file     Inability: Not on file    Transportation needs:     Medical: Not on file     Non-medical: Not on file   Tobacco Use    Smoking status: Current Every Day Smoker     Packs/day: 0 50     Types: Cigarettes    Smokeless tobacco: Never Used   Substance and Sexual Activity    Alcohol use: Yes     Comment: socially    Drug use: No    Sexual activity: Not on file   Lifestyle    Physical activity:     Days per week: Not on file     Minutes per session: Not on file    Stress: Not on file   Relationships    Social connections:     Talks on phone: Not on file     Gets together: Not on file     Attends Buddhism service: Not on file     Active member of club or organization: Not on file     Attends meetings of clubs or organizations: Not on file     Relationship status: Not on file    Intimate partner violence:     Fear of current or ex partner: Not on file     Emotionally abused: Not on file     Physically abused: Not on file     Forced sexual activity: Not on file   Other Topics Concern    Not on file   Social History Narrative    Not on file     Family History   Problem Relation Age of Onset    Lung cancer Father 52    Endometrial cancer Sister 61       Current Medications: At the time this note was written these were the medications the patient was on    Current Outpatient Medications   Medication Sig Dispense Refill    buPROPion (ZYBAN) 150 MG 12 hr tablet Take 150 mg by mouth daily  2    cholecalciferol (VITAMIN D3) 1,000 units tablet Take 1 tablet (1,000 Units total) by mouth daily 90 tablet 3    fluconazole (DIFLUCAN) 150 mg tablet Take 150 mg by mouth daily  0    fluconazole (DIFLUCAN) 200 mg tablet TAKE 1 TABLET BY MOUTH ONE TIME  0    glimepiride (AMARYL) 2 mg tablet Take 1 tablet in the morning with breakfast and 2 tablets in the evening with dinner, E11 9 270 tablet 3    glucose blood test strip Use test strip to check blood sugar 2 times a day, E11 9 One touch ultra 100 each 3    ibuprofen (MOTRIN) 800 mg tablet Take by mouth      IMVEXXY MAINTENANCE PACK 4 MCG INST       insulin glargine (LANTUS SOLOSTAR) 100 units/mL injection pen Inject 23 Units under the skin every morning 5 pen 3    JANUVIA 100 MG tablet   0    Lancets (ONETOUCH ULTRASOFT) lancets Use to test blood sugar 2 times a day, E11 9 100 each 3    lisinopril (ZESTRIL) 20 mg tablet Take 20 mg by mouth daily  0    lisinopril (ZESTRIL) 30 mg tablet Take by mouth      LISINOPRIL PO Take by mouth      PROAIR  (90 Base) MCG/ACT inhaler Inhale 2 puffs 2 (two) times a day  2    RABEprazole (ACIPHEX) 20 MG tablet Take 20 mg by mouth daily  0    simvastatin (ZOCOR) 20 mg tablet   0    simvastatin (ZOCOR) 80 mg tablet Take by mouth      SIMVASTATIN PO Take by mouth      SITagliptin Phosphate (JANUVIA PO) Take by mouth       No current facility-administered medications for this visit  Allergies: Patient has no known allergies  Physical Exam:   Vital Signs:   /72   Pulse 79   Ht 5' 4" (1 626 m)   Wt 83 5 kg (184 lb)   BMI 31 58 kg/m²     Physical Exam   Constitutional: She is oriented to person, place, and time  She appears well-developed and well-nourished  HENT:   Head: Normocephalic and atraumatic  Nose: Nose normal    Mouth/Throat: Oropharynx is clear and moist  No oropharyngeal exudate  Eyes: Pupils are equal, round, and reactive to light  Conjunctivae and EOM are normal  No scleral icterus  Neck: Normal range of motion  Neck supple  No thyromegaly present  Cardiovascular: Normal rate and regular rhythm  Exam reveals no gallop and no friction rub  No murmur heard  Pulmonary/Chest: Effort normal and breath sounds normal  No stridor  No respiratory distress  She has no wheezes  She has no rales  Abdominal: Soft  Bowel sounds are normal  She exhibits no distension and no mass  There is no tenderness  There is no rebound and no guarding  Musculoskeletal: Normal range of motion  She exhibits no edema  Lymphadenopathy:     She has no cervical adenopathy  Neurological: She is alert and oriented to person, place, and time  Skin: Skin is warm and dry  No rash noted  No erythema  Psychiatric: She has a normal mood and affect  Her behavior is normal  Judgment and thought content normal           Labs and Imaging:      Component      Latest Ref Rng & Units 9/18/2019   White Blood Cell Count      3 4 - 10 8 x10E3/uL 10 4   Red Blood Cell Count      3 77 - 5 28 x10E6/uL 5 30 (H)   Hemoglobin      11 1 - 15 9 g/dL 15 2   HCT      34 0 - 46 6 % 44 9   MCV      79 - 97 fL 85   MCH      26 6 - 33 0 pg 28 7   MCHC        31 5 - 35 7 g/dL 33 9   RDW      12 3 - 15 4 % 14 4   Platelet Count      213 - 450 x10E3/uL 272   Neutrophils      Not Estab  % 66   Lymphocytes      Not Estab  % 26   Monocytes      Not Estab  % 6   Eosinophils      Not Estab  % 2   Basophils PCT      Not Estab  % 0   Neutrophils (Absolute)      1 4 - 7 0 x10E3/uL 6 8   Lymphocytes (Absolute)      0 7 - 3 1 x10E3/uL 2 7   Monocytes (Absolute)      0 1 - 0 9 x10E3/uL 0 7   Eosinophils (Absolute)      0 0 - 0 4 x10E3/uL 0 2   Basophils ABS      0 0 - 0 2 x10E3/uL 0 0   Immature Granulocytes      Not Estab  % 0   Immature Granulocytes (Absolute)      0 0 - 0 1 x10E3/uL 0 0   Glucose, Random      65 - 99 mg/dL 98   BUN      8 - 27 mg/dL 9   Creatinine      0 57 - 1 00 mg/dL 0 73   eGFR Non       >59 mL/min/1 73 89   eGFR       >59 mL/min/1 73 103   SL AMB BUN/CREATININE RATIO      12 - 28 12   Sodium      134 - 144 mmol/L 144   Potassium      3 5 - 5 2 mmol/L 3 8   Chloride      96 - 106 mmol/L 106   CO2      20 - 29 mmol/L 23   CALCIUM      8 7 - 10 3 mg/dL 9 6   Total Protein      6 0 - 8 5 g/dL 6 5   Albumin      3 6 - 4 8 g/dL 4 1   Globulin, Total      1 5 - 4 5 g/dL 2 4   Albumin/Globulin Ratio      1 2 - 2 2 1 7   TOTAL BILIRUBIN      0 0 - 1 2 mg/dL 0 5   ALKALINE PHOSPHATASE ISOENZYMES      39 - 117 IU/L 70   AST 0 - 40 IU/L 15   ALT      0 - 32 IU/L 15   Cholesterol      100 - 199 mg/dL 183   Triglycerides      0 - 149 mg/dL 107   HDL      >39 mg/dL 37 (L)   VLDL Cholesterol Jose E      5 - 40 mg/dL 21   LDL Direct      0 - 99 mg/dL 125 (H)   EXT Creatinine Urine      Not Estab  mg/dL 104 5   MICROALBUM ,U,RANDOM      Not Estab  ug/mL 13 5   MICROALBUMIN/CREATININE RATIO      0 0 - 30 0 mg/g creat 12 9   Hemoglobin A1C      4 8 - 5 6 % 10 1 (H)   Free T4      0 82 - 1 77 ng/dL 1 44   TSH, POC      0 450 - 4 500 uIU/mL 0 494   C-PEPTIDE      1 1 - 4 4 ng/mL 1 9   25-Hydroxy, Vitamin D      30 0 - 100 0 ng/mL 25 2 (L)   THYROID STIMULATING IMMUNOGLOBULIN      0 00 - 0 55 IU/L <0 10   Glutaminc Acid Decarboxylase 65 Ab      0 0 - 5 0 U/mL <5 0   T3, Total      71 - 180 ng/dL 114   THYROID MICROSOMAL ANTIBODY      0 - 34 IU/mL 17

## 2019-10-15 NOTE — PROGRESS NOTES
Type 2 Diabetes Class Assessment    HPI: Met with Suze Mccarthy for DSME Initial visit  Elsy has Type 2 Diabetes, HTN, HLD, Obesity  Patient is taking her medications as prescribed  She is not testing her blood sugar often  She self reports last fasting reading was 130  HbA1c on 19 was 10 1  She was educated on using paired BG testing to find patterns of hyperglycemia to facilitate changes to diet/lifestyle/meds  She was encouraged to add strength training to her walking routine to decrease insulin resistance    Diabetes Assessment  Visit Type: Initial visit  Present at Session: patient   Special Learning Needs: No  Barriers to Learning: no barriers    How do you feel about making lifestyle changes at this time? Preparation  How would you rate your current knowledge of diabetes? fair  How confident are you that will be able to take better control of your diabetes?: good    How long have you had diabetes? 10 years  Have you had diabetes education in the past?: No  Do you have any family members with diabetes?: Yes  Do you monitor your blood sugar? yes  Type of blood sugar monitor: One Touch  How old is your meter?: 4-5 years  How often do you test your blood sugars?:4 x day  Do you keep a written record of your blood sugars? Yes   Blood sugar log with patient today and reviewed by educator?: No   Blood Sugar ranges:    Fastin's   Before meals:    2 hours after meals:   Any financial concerns pertaining to your diabetes supplies, medication or care?: No  Have you ever experienced hypoglycemia?:  Yes  Have you ever been hospitalized or gone to the ER due to your blood sugars?: Yes - when diagnosed  How do you treat low blood sugars?: Eat  How do you treat high blood sugars?  Dont know  Do you wear a Diabetes I D ?: no  Where do you dispose of your sharps (needles,lancetes)?: Twist needle off and put in container    Ht Readings from Last 1 Encounters:   19 5' 4" (1 626 m)     Wt Readings from Last 3 Encounters:   09/13/19 84 4 kg (186 lb)   04/04/19 83 9 kg (185 lb)   03/15/17 88 5 kg (195 lb)        There is no height or weight on file to calculate BMI  Lab Results   Component Value Date    HGBA1C 10 1 (H) 09/18/2019    HGBA1C 10 1 09/18/2019    HGBA1C 8 1 06/22/2019       No results found for: CHOL  Lab Results   Component Value Date    HDL 37 (L) 09/18/2019     No results found for: [facility]  Lab Results   Component Value Date    TRIG 107 09/18/2019     No results found for: CHOLHDL  No results found for: Jose Lubin    Weight Change: Yes Lost 18 pounds unintentially    Diet Assessment    Do you follow any special diet presently?: No  Who cooks at home?:  patient  Who does the grocery shopping?: patient   How frequently do you eat out?: 1 x week    Activity Assessment    Exercise: Walk park every other day for 20 minutes    Lifestyle/Social Assessment    Racial/ethnic group:                                       Primary Language: English  Marital Status:   Education Level: [employer] (No Diploma)   Work status: Retired  Type of job and hours:   Who lives in your household?: none  Who is you primary support person(s): relative(s)   Describe your quality of life currently?: good  Any concerns for your safety?: No  Any Moravian or cultural practices that may affect your diabetes care: No  Do you have a decrease or loss of hearing?: No  Do you have a decrease or loss of vision?: No  When was the last time you had an ophthalmology exam?: September 2019  When was the last time you had dental exam?: Every 6 months  Do you check your feet for cracks, sores, debris?: Yes  When was the last time you had podiatry or foot exam?: Not for awhile  Last flu shot?: Never  Pneumonia shot?: No      The patient's history was reviewed and updated as appropriate: allergies, current medications      Intervention    Diabetes Overview :   Elsy was instructed on basic concepts of diabetes, including identifying role of diabetes self management, basic pathophysiology and types of diabetes, A1c and blood sugar targets  Claudia Dominguez has good understanding of material covered  Taking Medications: Instructed patient on action, side effects, efficacy, prescribed dosage and appropriate timing and frequency of administration of her diabetes medication  Claudia Dominguez has good understanding of material covered  Monitoring Blood Sugars  Instructions for Meter Teaching- Patient instructed in the following:  Site selection and skin preparation, Loading strips and lancet device, meter activation, obtaining blood sample, test strip and lancet disposal and recording log book entries  Patient has good understanding of material covered and was able to test their own blood sugar in office today  Testing frequency: Encouraged pair testing  Test sugars before a meal and 2hr after the same meal, rotating between breakfast, lunch, and dinner  Test sugars twice a day (3 days a week, 7 days a week)  Goal Blood Sugars:   Premeal , even better <110  2hr after a meal <180, even better <140  A1C <7%, even better <6 5%  Hypoglycemia: Instructed patient on definition/risk of hypoglycemia, treatment, causes/symptoms, when to notify provider of lows, prevention of hypoglycemia and exercise precautions  Comments: Elsy verbalizes understanding of hypoglycemia concepts      Physical Activity: Discussed benefits of physical activity to optimize blood glucose control, encouraged activity at patient is physically able   Always consult a physician prior to starting an exercise program   Comments: Elsy verbalizes understanding of hypoglycemia concepts        Diabetes Education Record  Elsy received the following handouts: Know Your Blood Sugar Numbers, BG Log, Hypoglycemia, 15/15 Rule, Nutrition Facts      Patient response to instruction    Comprehensiongood  Motivationgood  Expected Compliancegood    Thank you for referring your patient to [address] [place] [address], it was a pleasure working with them today  Please feel free to call with any questions or concerns      Rae Salinas RD CDE  [address] [id number] [phone]

## 2019-10-15 NOTE — PATIENT INSTRUCTIONS
Continue lantus 20 units at bedtime    Continue glimepiride 2mg once a day    Continue januvia 100mg once a day    Send me blood sugars in 3-4 weeks    Follow up in 6 weeks

## 2019-10-17 DIAGNOSIS — Z79.4 TYPE 2 DIABETES MELLITUS WITH HYPERGLYCEMIA, WITH LONG-TERM CURRENT USE OF INSULIN (HCC): ICD-10-CM

## 2019-10-17 DIAGNOSIS — I10 HYPERTENSION GOAL BP (BLOOD PRESSURE) < 140/90: ICD-10-CM

## 2019-10-17 DIAGNOSIS — E78.00 PURE HYPERCHOLESTEROLEMIA: ICD-10-CM

## 2019-10-17 DIAGNOSIS — E11.65 TYPE 2 DIABETES MELLITUS WITH HYPERGLYCEMIA, WITH LONG-TERM CURRENT USE OF INSULIN (HCC): ICD-10-CM

## 2019-10-17 DIAGNOSIS — E55.9 VITAMIN D DEFICIENCY: ICD-10-CM

## 2019-10-17 DIAGNOSIS — Z79.4 TYPE 2 DIABETES MELLITUS WITHOUT COMPLICATION, WITH LONG-TERM CURRENT USE OF INSULIN (HCC): ICD-10-CM

## 2019-10-17 DIAGNOSIS — E11.9 TYPE 2 DIABETES MELLITUS WITHOUT COMPLICATION, WITH LONG-TERM CURRENT USE OF INSULIN (HCC): ICD-10-CM

## 2019-10-17 RX ORDER — SITAGLIPTIN 100 MG/1
100 TABLET, FILM COATED ORAL DAILY
Qty: 90 TABLET | Refills: 3 | Status: SHIPPED | OUTPATIENT
Start: 2019-10-17 | End: 2020-09-21

## 2019-10-17 RX ORDER — GLIMEPIRIDE 1 MG/1
TABLET ORAL
Qty: 90 TABLET | Refills: 3 | Status: SHIPPED | OUTPATIENT
Start: 2019-10-17 | End: 2022-06-14

## 2019-12-03 ENCOUNTER — OFFICE VISIT (OUTPATIENT)
Dept: DIABETES SERVICES | Facility: CLINIC | Age: 59
End: 2019-12-03
Payer: COMMERCIAL

## 2019-12-03 DIAGNOSIS — E11.65 TYPE 2 DIABETES MELLITUS WITH HYPERGLYCEMIA, WITH LONG-TERM CURRENT USE OF INSULIN (HCC): Primary | ICD-10-CM

## 2019-12-03 DIAGNOSIS — Z79.4 TYPE 2 DIABETES MELLITUS WITH HYPERGLYCEMIA, WITH LONG-TERM CURRENT USE OF INSULIN (HCC): Primary | ICD-10-CM

## 2019-12-03 PROCEDURE — G0109 DIAB MANAGE TRN IND/GROUP: HCPCS | Performed by: DIETITIAN, REGISTERED

## 2019-12-03 NOTE — PROGRESS NOTES
Living Well with Diabetes Group Class #1    Hema Steward attended the Living Well with Diabetes Group Class #1  Topics Covered in class today include: What is diabetes; Types of Diabetes; How Diabetes is diagnosed; Management skills; the role of exercise in blood sugar managements, Home glucose monitoring, and target ranges  Lorenzo Olmedo participated in group activities    The patient's history was reviewed and updated as appropriate: allergies, current medications  Lab Results   Component Value Date    HGBA1C 10 1 (H) 09/18/2019       Diabetes Education Record  Lorenzo Olmedo was provided Living Well with Diabetes Class #1 book      Patient response to instruction    Comprehensiongood  Motivationgood  Expected Compliancegood    Thank you for referring your patient to [facility], it was a pleasure working with them today  Please feel free to call with any questions or concerns      Tyson Watts RD CDE  [address] [address] [phone]

## 2019-12-05 ENCOUNTER — OFFICE VISIT (OUTPATIENT)
Dept: DIABETES SERVICES | Facility: CLINIC | Age: 59
End: 2019-12-05
Payer: COMMERCIAL

## 2019-12-05 DIAGNOSIS — Z79.4 TYPE 2 DIABETES MELLITUS WITH HYPERGLYCEMIA, WITH LONG-TERM CURRENT USE OF INSULIN (HCC): Primary | ICD-10-CM

## 2019-12-05 DIAGNOSIS — E11.65 TYPE 2 DIABETES MELLITUS WITH HYPERGLYCEMIA, WITH LONG-TERM CURRENT USE OF INSULIN (HCC): Primary | ICD-10-CM

## 2019-12-05 PROCEDURE — G0109 DIAB MANAGE TRN IND/GROUP: HCPCS | Performed by: DIETITIAN, REGISTERED

## 2019-12-05 NOTE — PROGRESS NOTES
Living Well with Diabetes Group Class #2    [facility] Living Well with [facility]  During class, Sameerfloraalexis was instructed on the following topics: Macronutrients, Carbohydrate sources, What one serving of carbohydrate equals, effects of diet on blood glucose levels, effect of carbohydrates on blood glucose levels, basics of meal planning: balance, portions, meal times, measurements, reading food labels to determine carbohydrates  Vincent Jimenez participated in group activities of reading labels together and completing the meal planning activity  Vincent Jimenez will follow up with class #3  Will call with any questions or concerns prior to next session  The patient's history was reviewed and updated as appropriate: allergies, current medications  Lab Results   Component Value Date    HGBA1C 10 1 (H) 09/18/2019       Diabetes Education Record  Vincent Jimenez was provided Living Well with Diabetes Class #2 book      Patient response to instruction    Comprehensiongood  Motivationgood  Expected Compliancegood    Thank you for referring your patient to [facility], it was a pleasure working with them today  Please feel free to call with any questions or concerns      Sony Guerra RD CDE  [address] [id number] [phone]

## 2019-12-10 ENCOUNTER — OFFICE VISIT (OUTPATIENT)
Dept: ENDOCRINOLOGY | Facility: CLINIC | Age: 59
End: 2019-12-10
Payer: COMMERCIAL

## 2019-12-10 ENCOUNTER — OFFICE VISIT (OUTPATIENT)
Dept: DIABETES SERVICES | Facility: CLINIC | Age: 59
End: 2019-12-10
Payer: COMMERCIAL

## 2019-12-10 VITALS
HEIGHT: 64 IN | SYSTOLIC BLOOD PRESSURE: 140 MMHG | DIASTOLIC BLOOD PRESSURE: 82 MMHG | BODY MASS INDEX: 32.1 KG/M2 | WEIGHT: 188 LBS | HEART RATE: 79 BPM

## 2019-12-10 DIAGNOSIS — E55.9 VITAMIN D DEFICIENCY: ICD-10-CM

## 2019-12-10 DIAGNOSIS — Z79.4 TYPE 2 DIABETES MELLITUS WITH HYPERGLYCEMIA, WITH LONG-TERM CURRENT USE OF INSULIN (HCC): ICD-10-CM

## 2019-12-10 DIAGNOSIS — I10 HYPERTENSION GOAL BP (BLOOD PRESSURE) < 140/90: ICD-10-CM

## 2019-12-10 DIAGNOSIS — Z79.4 TYPE 2 DIABETES MELLITUS WITH HYPERGLYCEMIA, WITH LONG-TERM CURRENT USE OF INSULIN (HCC): Primary | ICD-10-CM

## 2019-12-10 DIAGNOSIS — E11.65 TYPE 2 DIABETES MELLITUS WITH HYPERGLYCEMIA, WITH LONG-TERM CURRENT USE OF INSULIN (HCC): Primary | ICD-10-CM

## 2019-12-10 DIAGNOSIS — E78.00 PURE HYPERCHOLESTEROLEMIA: Primary | ICD-10-CM

## 2019-12-10 DIAGNOSIS — E11.65 TYPE 2 DIABETES MELLITUS WITH HYPERGLYCEMIA, WITH LONG-TERM CURRENT USE OF INSULIN (HCC): ICD-10-CM

## 2019-12-10 PROCEDURE — 99214 OFFICE O/P EST MOD 30 MIN: CPT | Performed by: INTERNAL MEDICINE

## 2019-12-10 PROCEDURE — G0109 DIAB MANAGE TRN IND/GROUP: HCPCS | Performed by: DIETITIAN, REGISTERED

## 2019-12-10 NOTE — PROGRESS NOTES
ENDOCRINOLOGY  FOLLOW UP VISIT      Reason for Endocrine Consult/Chief Complaint:  Diabetes management         ? Medical Decision Making:     Impression  1  DM2 uncontrolled  2  HTN  3  HLD   4  Vitamin D deficiency  5  Low TSH- now resolved      Recommendations:    Her reported blood sugars are fairly good with limited hyperglycemia  Continue lantus 20 units qHS and glimepiride 2mg qday and Januvia 100mg qday       Instructed to send me BG log in 2 weeks for review  She is due for all of her diabetes labs now        HTN- controlled on ACEi     HLD- continue simvastatin, repeat lipids  now  to assess for whether she needs higher potency statin if no improvement in LDL     Vitamin D deficiency- continue D3 replacement 1000 IU daily, repeat level now              RTC in 3 months     Ada ARANGO            History of Present Illness:   Mrs Senait Barlow is a 54yr old female who presents for DM management       PMH-DM2, HLD, HTN, vitamin D deficiency   PSH-shoulder surgery   FHx-DM in brothers and sister   SHx-+smoking 1ppd, social ETOH use, 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 Hypoglycemia-  None known     Having  heel pain    ? Review of Systems:   Review of Systems   Constitutional: Negative for appetite change, chills, diaphoresis, fatigue, fever and unexpected weight change     HENT: Negative for congestion, ear pain, hearing loss, rhinorrhea, sinus pressure, sinus pain, sore throat, trouble swallowing and voice change  Eyes: Negative for photophobia, redness and visual disturbance  Respiratory: Negative for apnea, cough, chest tightness, shortness of breath, wheezing and stridor  Cardiovascular: Negative for chest pain, palpitations and leg swelling  Gastrointestinal: Negative for abdominal distention, abdominal pain, constipation, diarrhea, nausea and vomiting  Endocrine: Negative for cold intolerance, heat intolerance, polydipsia, polyphagia and polyuria  Genitourinary: Negative for difficulty urinating, dysuria, flank pain, frequency, hematuria and urgency  Musculoskeletal: Negative for arthralgias, back pain, gait problem, joint swelling and myalgias  Skin: Negative for color change, pallor, rash and wound  Allergic/Immunologic: Negative for immunocompromised state  Neurological: Negative for dizziness, tremors, syncope, weakness, light-headedness and headaches  Hematological: Negative for adenopathy  Does not bruise/bleed easily  Psychiatric/Behavioral: Negative for confusion and sleep disturbance  The patient is not nervous/anxious  Patient History:     Past Medical History:   Diagnosis Date    [facility]     Hyperlipidemia     Hypertension      Past Surgical History:   Procedure Laterality Date    ROTATOR CUFF REPAIR       Social History     Socioeconomic History    Marital status:       Spouse name: Not on file    Number of children: Not on file    Years of education: Not on file    Highest education level: Not on file   Occupational History    Not on file   Social Needs    Financial resource strain: Not on file    Food insecurity:     Worry: Not on file     Inability: Not on file    Transportation needs:     Medical: Not on file     Non-medical: Not on file   Tobacco Use    Smoking status: Current Every Day Smoker     Packs/day: 0 50     Types: Cigarettes    Smokeless tobacco: Never Used   Substance and Sexual Activity    Alcohol use: Yes     Comment: socially    Drug use: No    Sexual activity: Not on file   Lifestyle    Physical activity:     Days per week: Not on file     Minutes per session: Not on file    Stress: Not on file   Relationships    Social connections:     Talks on phone: Not on file     Gets together: Not on file     Attends Sabianism service: Not on file     Active member of club or organization: Not on file     Attends meetings of clubs or organizations: Not on file     Relationship status: Not on file    Intimate partner violence:     Fear of current or ex partner: Not on file     Emotionally abused: Not on file     Physically abused: Not on file     Forced sexual activity: Not on file   Other Topics Concern    Not on file   Social History Narrative    Not on file     Family History   Problem Relation Age of Onset    Lung cancer Father 52    Endometrial cancer Sister 61       Current Medications: At the time this note was written these were the medications the patient was on    Current Outpatient Medications   Medication Sig Dispense Refill    buPROPion (ZYBAN) 150 MG 12 hr tablet Take 150 mg by mouth daily  2    cholecalciferol (VITAMIN D3) 1,000 units tablet Take 1 tablet (1,000 Units total) by mouth daily 90 tablet 3    glimepiride (AMARYL) 1 mg tablet Take 1 tablet in the morning with breakfast  E11 65 90 tablet 3    glucose blood test strip Use test strip to check blood sugar 2 times a day, E11 9 One touch ultra 100 each 3    ibuprofen (MOTRIN) 800 mg tablet Take by mouth      IMVEXXY MAINTENANCE PACK 4 MCG INST       insulin glargine (LANTUS SOLOSTAR) 100 units/mL injection pen Inject 23 Units under the skin every morning (Patient taking differently: Inject 20 Units under the skin every morning ) 5 pen 3    JANUVIA 100 MG tablet Take 1 tablet (100 mg total) by mouth daily 90 tablet 3    Lancets (ONETOUCH ULTRASOFT) lancets Use to test blood sugar 2 times a day, E11 9 100 each 3    lisinopril (ZESTRIL) 20 mg tablet Take 20 mg by mouth daily  0    Multiple Vitamins-Minerals (CENTRUM SILVER 50+WOMEN) TABS Take by mouth      PROAIR  (90 Base) MCG/ACT inhaler Inhale 2 puffs 2 (two) times a day  2    RABEprazole (ACIPHEX) 20 MG tablet Take 20 mg by mouth daily  0    simvastatin (ZOCOR) 20 mg tablet   0    fluconazole (DIFLUCAN) 150 mg tablet Take 150 mg by mouth daily  0    fluconazole (DIFLUCAN) 200 mg tablet TAKE 1 TABLET BY MOUTH ONE TIME  0    lisinopril (ZESTRIL) 30 mg tablet Take by mouth      simvastatin (ZOCOR) 80 mg tablet Take by mouth       No current facility-administered medications for this visit  Allergies: Patient has no known allergies  Physical Exam:   Vital Signs:   /82   Pulse 79   Ht 5' 4" (1 626 m)   Wt 85 3 kg (188 lb)   BMI 32 27 kg/m²     Physical Exam   Constitutional: She is oriented to person, place, and time  She appears well-developed and well-nourished  HENT:   Head: Normocephalic and atraumatic  Nose: Nose normal    Mouth/Throat: Oropharynx is clear and moist  No oropharyngeal exudate  Eyes: Pupils are equal, round, and reactive to light  Conjunctivae and EOM are normal  No scleral icterus  Neck: Normal range of motion  Neck supple  No thyromegaly present  Cardiovascular: Normal rate, regular rhythm and normal heart sounds  Exam reveals no gallop and no friction rub  No murmur heard  Pulmonary/Chest: Effort normal and breath sounds normal  No stridor  No respiratory distress  She has no wheezes  She has no rales  Abdominal: Soft  Bowel sounds are normal  She exhibits no distension and no mass  There is no tenderness  There is no rebound and no guarding  Musculoskeletal: Normal range of motion  She exhibits no edema  Lymphadenopathy:     She has no cervical adenopathy  Neurological: She is alert and oriented to person, place, and time  Skin: Skin is warm and dry  No rash noted  No erythema  No pallor  Psychiatric: She has a normal mood and affect   Her behavior is normal  Judgment and thought content normal         Labs and Imaging:    No recent labs

## 2019-12-10 NOTE — PROGRESS NOTES
Living Well with Diabetes Group Class #3    Haethermichael Gianna attended the Living Well with Diabetes Group Class #3  During class, Sameerbraxton was instructed on the following topics: Oral and injectable medications, short term complications of diabetes, long term complications of diabetes, prevention of complications, foot care, sick day management, stress management, and traveling with diabetes  Seven Poe participated in group activities  Seven Poe will follow up with class #4  Will call with any questions or concerns prior to next session  The patient's history was reviewed and updated as appropriate: allergies, current medications  Lab Results   Component Value Date    HGBA1C 10 1 (H) 09/18/2019       Diabetes Education Record  Seven Poe was provided Living Well with Diabetes Class #3 book      Patient response to instruction    Comprehensiongood  Motivationgood  Expected Compliancegood    Thank you for referring your patient to [facility], it was a pleasure working with them today  Please feel free to call with any questions or concerns      Bonita Edwards RD CDE  [address] [address] [phone]

## 2019-12-12 ENCOUNTER — OFFICE VISIT (OUTPATIENT)
Dept: DIABETES SERVICES | Facility: CLINIC | Age: 59
End: 2019-12-12
Payer: COMMERCIAL

## 2019-12-12 DIAGNOSIS — Z79.4 TYPE 2 DIABETES MELLITUS WITH HYPERGLYCEMIA, WITH LONG-TERM CURRENT USE OF INSULIN (HCC): Primary | ICD-10-CM

## 2019-12-12 DIAGNOSIS — E11.65 TYPE 2 DIABETES MELLITUS WITH HYPERGLYCEMIA, WITH LONG-TERM CURRENT USE OF INSULIN (HCC): Primary | ICD-10-CM

## 2019-12-12 PROCEDURE — G0109 DIAB MANAGE TRN IND/GROUP: HCPCS | Performed by: DIETITIAN, REGISTERED

## 2019-12-12 NOTE — PATIENT INSTRUCTIONS
1  Check food labels and try to choose foods with the least amount of saturated fat and sodium  2   When eating out, check nutrition analysis on the computer or phone lupis so that you can make the best choices to stay within your guidelines for carb, sodium and saturated fat

## 2019-12-12 NOTE — PROGRESS NOTES
Living Well with Diabetes Group Class #4    Gal Ortega attended the Living Well with Diabetes Group Class #4  During class, Elsy was instructed on the following topics: Types of cholesterol, dietary sources of cholesterol, types of fat, types of fiber, reading food labels- in depth, healthy choices when dining out  Gonzalo Lopez participated in group activities of reading labels together and completed the dining out activity  Gonzalo Lopez will follow up with class #5 or additional DSMT/MNT as desired  Will call with any questions or concerns prior to next session  The patient's history was reviewed and updated as appropriate: allergies, current medications  Lab Results   Component Value Date    HGBA1C 10 1 (H) 09/18/2019       Diabetes Education Record  Gonzalo Lopez was provided Living Well with Diabetes Class #4 book      Patient response to instruction    Comprehensiongood  Motivationgood  Expected Compliancegood    Thank you for referring your patient to [address] [place] [address], it was a pleasure working with them today  Please feel free to call with any questions or concerns      Devonte Boyce RD CDE  [address] [address] [phone]

## 2020-01-08 LAB — HBA1C MFR BLD HPLC: 5.7 %

## 2020-01-09 LAB
25(OH)D3+25(OH)D2 SERPL-MCNC: 33.4 NG/ML (ref 30–100)
ALBUMIN SERPL-MCNC: 4.1 G/DL (ref 3.6–4.8)
ALBUMIN/GLOB SERPL: 1.6 {RATIO} (ref 1.2–2.2)
ALP SERPL-CCNC: 71 IU/L (ref 39–117)
ALT SERPL-CCNC: 16 IU/L (ref 0–32)
AST SERPL-CCNC: 18 IU/L (ref 0–40)
BILIRUB SERPL-MCNC: 0.3 MG/DL (ref 0–1.2)
BUN SERPL-MCNC: 15 MG/DL (ref 8–27)
BUN/CREAT SERPL: 18 (ref 12–28)
CALCIUM SERPL-MCNC: 10 MG/DL (ref 8.7–10.3)
CHLORIDE SERPL-SCNC: 103 MMOL/L (ref 96–106)
CHOLEST SERPL-MCNC: 178 MG/DL (ref 100–199)
CO2 SERPL-SCNC: 25 MMOL/L (ref 20–29)
CREAT SERPL-MCNC: 0.82 MG/DL (ref 0.57–1)
GLOBULIN SER-MCNC: 2.5 G/DL (ref 1.5–4.5)
GLUCOSE SERPL-MCNC: 78 MG/DL (ref 65–99)
HBA1C MFR BLD: 5.7 % (ref 4.8–5.6)
HDLC SERPL-MCNC: 44 MG/DL
LDLC SERPL CALC-MCNC: 115 MG/DL (ref 0–99)
POTASSIUM SERPL-SCNC: 4 MMOL/L (ref 3.5–5.2)
PROT SERPL-MCNC: 6.6 G/DL (ref 6–8.5)
SL AMB EGFR AFRICAN AMERICAN: 89 ML/MIN/1.73
SL AMB EGFR NON AFRICAN AMERICAN: 77 ML/MIN/1.73
SL AMB VLDL CHOLESTEROL CALC: 19 MG/DL (ref 5–40)
SODIUM SERPL-SCNC: 143 MMOL/L (ref 134–144)
TRIGL SERPL-MCNC: 93 MG/DL (ref 0–149)

## 2020-01-12 ENCOUNTER — TELEPHONE (OUTPATIENT)
Dept: ENDOCRINOLOGY | Facility: CLINIC | Age: 60
End: 2020-01-12

## 2020-01-12 NOTE — TELEPHONE ENCOUNTER
Blake Antonella,    Please let patient know her labs    -her A1C is much better 5 7% at goal now   -her LDL bad cholesterol improved to 115, I would like that to get to below 100, we will discuss at next visit if she needs a stronger cholesterol pill but for now continue the simvastatin  -her Vitamin D levels are normal     [facility]    Endocrinology    Component      Latest Ref Rng & Units 1/8/2020   Glucose, Random      65 - 99 mg/dL 78   BUN      8 - 27 mg/dL 15   Creatinine      0 57 - 1 00 mg/dL 0 82   eGFR Non       >59 mL/min/1 73 77   eGFR       >59 mL/min/1 73 89   SL AMB BUN/CREATININE RATIO      12 - 28 18   Sodium      134 - 144 mmol/L 143   Potassium      3 5 - 5 2 mmol/L 4 0   Chloride      96 - 106 mmol/L 103   CO2      20 - 29 mmol/L 25   CALCIUM      8 7 - 10 3 mg/dL 10 0   Total Protein      6 0 - 8 5 g/dL 6 6   Albumin      3 6 - 4 8 g/dL 4 1   Globulin, Total      1 5 - 4 5 g/dL 2 5   Albumin/Globulin Ratio      1 2 - 2 2 1 6   TOTAL BILIRUBIN      0 0 - 1 2 mg/dL 0 3   ALKALINE PHOSPHATASE ISOENZYMES      39 - 117 IU/L 71   AST      0 - 40 IU/L 18   ALT      0 - 32 IU/L 16   Cholesterol      100 - 199 mg/dL 178   Triglycerides      0 - 149 mg/dL 93   HDL      >39 mg/dL 44   VLDL Cholesterol Jose E      5 - 40 mg/dL 19   LDL Direct      0 - 99 mg/dL 115 (H)   Hemoglobin A1C      4 8 - 5 6 % 5 7 (H)   25-Hydroxy, Vitamin D      30 0 - 100 0 ng/mL 33 4

## 2020-01-13 NOTE — TELEPHONE ENCOUNTER
Spoke with patient and reviewed her lab results  She understands  Sent lab results to PCP Dr Billy Layton

## 2020-01-28 DIAGNOSIS — E11.9 TYPE 2 DIABETES MELLITUS WITHOUT COMPLICATION, WITH LONG-TERM CURRENT USE OF INSULIN (HCC): ICD-10-CM

## 2020-01-28 DIAGNOSIS — Z79.4 TYPE 2 DIABETES MELLITUS WITHOUT COMPLICATION, WITH LONG-TERM CURRENT USE OF INSULIN (HCC): ICD-10-CM

## 2020-01-29 RX ORDER — LANCETS
EACH MISCELLANEOUS
Qty: 200 EACH | Refills: 0 | Status: SHIPPED | OUTPATIENT
Start: 2020-01-29

## 2020-02-11 ENCOUNTER — TRANSCRIBE ORDERS (OUTPATIENT)
Dept: ADMINISTRATIVE | Facility: HOSPITAL | Age: 60
End: 2020-02-11

## 2020-02-11 DIAGNOSIS — R63.4 LOSS OF WEIGHT: Primary | ICD-10-CM

## 2020-02-11 DIAGNOSIS — C25.9 MALIGNANT NEOPLASM OF PANCREAS, UNSPECIFIED LOCATION OF MALIGNANCY (HCC): ICD-10-CM

## 2020-02-11 DIAGNOSIS — K86.1 CHRONIC PANCREATITIS, UNSPECIFIED PANCREATITIS TYPE (HCC): ICD-10-CM

## 2020-02-15 ENCOUNTER — HOSPITAL ENCOUNTER (OUTPATIENT)
Dept: RADIOLOGY | Facility: HOSPITAL | Age: 60
Discharge: HOME | End: 2020-02-15
Payer: COMMERCIAL

## 2020-02-15 DIAGNOSIS — K86.1 CHRONIC PANCREATITIS, UNSPECIFIED PANCREATITIS TYPE (HCC): ICD-10-CM

## 2020-02-15 DIAGNOSIS — R63.4 LOSS OF WEIGHT: ICD-10-CM

## 2020-02-15 DIAGNOSIS — C25.9 MALIGNANT NEOPLASM OF PANCREAS, UNSPECIFIED LOCATION OF MALIGNANCY (HCC): ICD-10-CM

## 2020-02-15 PROCEDURE — 74177 CT ABD & PELVIS W/CONTRAST: CPT

## 2020-02-15 RX ADMIN — IOHEXOL 100 ML: 350 INJECTION, SOLUTION INTRAVENOUS at 11:06

## 2020-02-27 DIAGNOSIS — E11.9 TYPE 2 DIABETES MELLITUS WITHOUT COMPLICATION, WITH LONG-TERM CURRENT USE OF INSULIN (HCC): ICD-10-CM

## 2020-02-27 DIAGNOSIS — Z79.4 TYPE 2 DIABETES MELLITUS WITHOUT COMPLICATION, WITH LONG-TERM CURRENT USE OF INSULIN (HCC): ICD-10-CM

## 2020-02-28 DIAGNOSIS — Z79.4 TYPE 2 DIABETES MELLITUS WITHOUT COMPLICATION, WITH LONG-TERM CURRENT USE OF INSULIN (HCC): ICD-10-CM

## 2020-02-28 DIAGNOSIS — E11.65 TYPE 2 DIABETES MELLITUS WITH HYPERGLYCEMIA, WITH LONG-TERM CURRENT USE OF INSULIN (HCC): Primary | ICD-10-CM

## 2020-02-28 DIAGNOSIS — E11.9 TYPE 2 DIABETES MELLITUS WITHOUT COMPLICATION, WITH LONG-TERM CURRENT USE OF INSULIN (HCC): ICD-10-CM

## 2020-02-28 DIAGNOSIS — E11.65 TYPE 2 DIABETES MELLITUS WITH HYPERGLYCEMIA, WITH LONG-TERM CURRENT USE OF INSULIN (HCC): ICD-10-CM

## 2020-02-28 DIAGNOSIS — Z79.4 TYPE 2 DIABETES MELLITUS WITH HYPERGLYCEMIA, WITH LONG-TERM CURRENT USE OF INSULIN (HCC): ICD-10-CM

## 2020-02-28 DIAGNOSIS — Z79.4 TYPE 2 DIABETES MELLITUS WITH HYPERGLYCEMIA, WITH LONG-TERM CURRENT USE OF INSULIN (HCC): Primary | ICD-10-CM

## 2020-02-28 RX ORDER — INSULIN GLARGINE 100 [IU]/ML
INJECTION, SOLUTION SUBCUTANEOUS
Qty: 30 ML | OUTPATIENT
Start: 2020-02-28

## 2020-03-02 RX ORDER — LISINOPRIL 20 MG/1
TABLET ORAL
COMMUNITY
Start: 2020-02-10 | End: 2021-06-02 | Stop reason: SDUPTHER

## 2020-03-02 RX ORDER — INSULIN GLARGINE 100 [IU]/ML
INJECTION, SOLUTION SUBCUTANEOUS
COMMUNITY
Start: 2020-01-03 | End: 2021-04-12

## 2020-03-02 RX ORDER — LANCETS
EACH MISCELLANEOUS
COMMUNITY
Start: 2019-12-04

## 2020-03-02 RX ORDER — SITAGLIPTIN 100 MG/1
TABLET, FILM COATED ORAL
COMMUNITY
Start: 2020-02-04 | End: 2022-06-14

## 2020-03-02 RX ORDER — SIMVASTATIN 20 MG
TABLET ORAL
COMMUNITY
Start: 2020-01-08 | End: 2020-05-13

## 2020-03-02 RX ORDER — RABEPRAZOLE SODIUM 20 MG/1
TABLET, DELAYED RELEASE ORAL
COMMUNITY
Start: 2019-12-12

## 2020-03-05 ENCOUNTER — OFFICE VISIT (OUTPATIENT)
Dept: ENDOCRINOLOGY | Facility: CLINIC | Age: 60
End: 2020-03-05
Payer: COMMERCIAL

## 2020-03-05 VITALS
SYSTOLIC BLOOD PRESSURE: 110 MMHG | HEIGHT: 64 IN | HEART RATE: 76 BPM | DIASTOLIC BLOOD PRESSURE: 70 MMHG | BODY MASS INDEX: 31.58 KG/M2 | WEIGHT: 185 LBS

## 2020-03-05 DIAGNOSIS — K86.9 LESION OF PANCREAS: ICD-10-CM

## 2020-03-05 DIAGNOSIS — Z79.4 TYPE 2 DIABETES MELLITUS WITH HYPERGLYCEMIA, WITH LONG-TERM CURRENT USE OF INSULIN (HCC): Primary | ICD-10-CM

## 2020-03-05 DIAGNOSIS — E55.9 VITAMIN D DEFICIENCY: ICD-10-CM

## 2020-03-05 DIAGNOSIS — I10 HYPERTENSION GOAL BP (BLOOD PRESSURE) < 140/90: ICD-10-CM

## 2020-03-05 DIAGNOSIS — E11.65 TYPE 2 DIABETES MELLITUS WITH HYPERGLYCEMIA, WITH LONG-TERM CURRENT USE OF INSULIN (HCC): Primary | ICD-10-CM

## 2020-03-05 DIAGNOSIS — E78.00 PURE HYPERCHOLESTEROLEMIA: ICD-10-CM

## 2020-03-05 DIAGNOSIS — E27.9 LESION OF ADRENAL GLAND (HCC): ICD-10-CM

## 2020-03-05 PROCEDURE — 99214 OFFICE O/P EST MOD 30 MIN: CPT | Performed by: INTERNAL MEDICINE

## 2020-03-05 RX ORDER — DEXAMETHASONE 1 MG
TABLET ORAL
Qty: 1 TABLET | Refills: 0 | Status: SHIPPED | OUTPATIENT
Start: 2020-03-05 | End: 2020-05-13 | Stop reason: SDUPTHER

## 2020-03-05 NOTE — PROGRESS NOTES
ENDOCRINOLOGY  FOLLOW UP VISIT      Reason for Endocrine Consult/Chief Complaint: DM management     ? Medical Decision Making:     Impression  1  DM2 uncontrolled  2  HTN  3  HLD   4  Vitamin D deficiency  5  Low TSH- now resolved      Recommendations:     Her reported blood sugars are fairly good with limited hyperglycemia  Continue lantus 20 units qHS and glimepiride 2mg qday and Januvia 100mg qday    Instructed to send me BG log in 2 weeks for review so we can reduce basal insulin dose  A1C down to 5 7%       HTN- controlled on ACEi     HLD- continue simvastatin,LDL down to 115 Jan 2020, repeat in 3 months, if not <100 will increase dose or change to higher intensity statin, she states compliance     Vitamin D deficiency- continue D3 replacement 1000 IU daily, level replete 33 Jan 2020     Adrenal lesion 2cm left- newly discovered on CT scan, possibly lipid poor adenoma, will do MRI for further characterization, will check hypersecretion labs including plasma metanephrines, price/renin ratio, and 1mg dexamethasone suppression testing    Pancreatic head lobulation- seen on most recent CT scan- will order MRI to be done with and without contrast as per radiologist recommendation in report to evaluate the head of the pancreas as well, if there is an abnormality, will refer to her Gastroenterologist for further evaluation    RTC 2 months   Remington ARANGO            History of Present Illness:   Mrs Harmeet Robles is a 54yr old female who presents for DM management       PMH-DM2, HLD, HTN, vitamin D deficiency   PSH-shoulder surgery   FHx-DM in brothers and sister   SHx-+smoking 1ppd, social ETOH use, retired [employer] supervisor      Type of DM: 2  Age of onset:6 years hospitalized at [facility]  Microvascular complications:none known   Macrovascular complications: none known         Events since last visit:   Presently on lantus 20 units qAM and amaryl 2mg qday and januvia 100mg qday    Not checking BGs very often      FBG-132 this morning   Premeal/qHS BG-not checking   ? Hypoglycemia- none      Had CT scan abdomen with adrenal lesion discovered--had nausea and abdominal pain  Symptoms now resolved     GI Dr Dave Luna   ? Review of Systems:     Review of Systems   Constitutional: Negative for appetite change, chills, diaphoresis, fatigue, fever and unexpected weight change  HENT: Negative for congestion, ear pain, hearing loss, rhinorrhea, sinus pressure, sinus pain, sore throat, trouble swallowing and voice change  Eyes: Negative for photophobia, redness and visual disturbance  Respiratory: Negative for apnea, cough, chest tightness, shortness of breath, wheezing and stridor  Cardiovascular: Negative for chest pain, palpitations and leg swelling  Gastrointestinal: Negative for abdominal distention, abdominal pain, constipation, diarrhea, nausea and vomiting  Endocrine: Negative for cold intolerance, heat intolerance, polydipsia, polyphagia and polyuria  Genitourinary: Negative for difficulty urinating, dysuria, flank pain, frequency, hematuria and urgency  Musculoskeletal: Negative for arthralgias, back pain, gait problem, joint swelling and myalgias  Skin: Negative for color change, pallor, rash and wound  Allergic/Immunologic: Negative for immunocompromised state  Neurological: Negative for dizziness, tremors, syncope, weakness, light-headedness and headaches  Hematological: Negative for adenopathy  Does not bruise/bleed easily  Psychiatric/Behavioral: Negative for confusion and sleep disturbance  The patient is not nervous/anxious  ? Patient History:     Past Medical History:   Diagnosis Date    Diabetes mellitus ([facility] [address] [facility]     Hyperlipidemia     Hypertension      Past Surgical History:   Procedure Laterality Date    ROTATOR CUFF REPAIR       Social History     Socioeconomic History    Marital status:       Spouse name: Not on file    Number of children: Not on file    Years of education: Not on file    Highest education level: Not on file   Occupational History    Not on file   Social Needs    Financial resource strain: Not on file    Food insecurity:     Worry: Not on file     Inability: Not on file    Transportation needs:     Medical: Not on file     Non-medical: Not on file   Tobacco Use    Smoking status: Current Every Day Smoker     Packs/day: 0 50     Types: Cigarettes    Smokeless tobacco: Never Used   Substance and Sexual Activity    Alcohol use: Yes     Comment: socially    Drug use: No    Sexual activity: Not on file   Lifestyle    Physical activity:     Days per week: Not on file     Minutes per session: Not on file    Stress: Not on file   Relationships    Social connections:     Talks on phone: Not on file     Gets together: Not on file     Attends Yazidism service: Not on file     Active member of club or organization: Not on file     Attends meetings of clubs or organizations: Not on file     Relationship status: Not on file    Intimate partner violence:     Fear of current or ex partner: Not on file     Emotionally abused: Not on file     Physically abused: Not on file     Forced sexual activity: Not on file   Other Topics Concern    Not on file   Social History Narrative    Not on file     Family History   Problem Relation Age of Onset    Lung cancer Father 52    Endometrial cancer Sister 61       Current Medications: At the time this note was written these were the medications the patient was on    Current Outpatient Medications   Medication Sig Dispense Refill    buPROPion (ZYBAN) 150 MG 12 hr tablet Take 150 mg by mouth daily  2    cholecalciferol (VITAMIN D3) 1,000 units tablet Take 1 tablet (1,000 Units total) by mouth daily 90 tablet 3    fluconazole (DIFLUCAN) 150 mg tablet Take 150 mg by mouth daily  0    fluconazole (DIFLUCAN) 200 mg tablet TAKE 1 TABLET BY MOUTH ONE TIME  0    glimepiride (AMARYL) 1 mg tablet Take 1 tablet in the morning with breakfast  E11 65 90 tablet 3    glucose blood test strip Use test strip to check blood sugar 2 times a day, E11 9 One touch ultra 100 each 3    ibuprofen (MOTRIN) 800 mg tablet Take by mouth      IMVEXXY MAINTENANCE PACK 4 MCG INST       insulin glargine (Lantus SoloStar) 100 units/mL injection pen Inject 20 Units under the skin daily 30 mL 3    JANUVIA 100 MG tablet Take 1 tablet (100 mg total) by mouth daily 90 tablet 3    JANUVIA 100 MG tablet       Lancets (ONETOUCH ULTRASOFT) lancets USE TO TEST BLOOD SUGAR 2  TIMES A  each 0    Lancets (ONETOUCH ULTRASOFT) lancets       LANTUS SOLOSTAR 100 units/mL injection pen       lisinopril (ZESTRIL) 20 mg tablet Take 20 mg by mouth daily  0    lisinopril (ZESTRIL) 20 mg tablet       lisinopril (ZESTRIL) 30 mg tablet Take by mouth      Multiple Vitamins-Minerals (CENTRUM SILVER 50+WOMEN) TABS Take by mouth      ONE TOUCH ULTRA TEST test strip       PROAIR  (90 Base) MCG/ACT inhaler Inhale 2 puffs 2 (two) times a day  2    RABEprazole (ACIPHEX) 20 MG tablet       simvastatin (ZOCOR) 20 mg tablet   0    simvastatin (ZOCOR) 20 mg tablet        No current facility-administered medications for this visit  Allergies: Patient has no known allergies  Physical Exam:   Vital Signs:   /70   Pulse 76   Ht 5' 4" (1 626 m)   Wt 83 9 kg (185 lb)   BMI 31 76 kg/m²     Physical Exam   Constitutional: She is oriented to person, place, and time  She appears well-developed and well-nourished  HENT:   Head: Normocephalic and atraumatic  Nose: Nose normal    Mouth/Throat: Oropharynx is clear and moist  No oropharyngeal exudate  Eyes: Pupils are equal, round, and reactive to light  Conjunctivae and EOM are normal  No scleral icterus  Neck: Normal range of motion  Neck supple  No thyromegaly present  Cardiovascular: Normal rate, regular rhythm and normal heart sounds   Exam reveals no gallop and no friction rub  No murmur heard  Pulmonary/Chest: Effort normal and breath sounds normal  No stridor  No respiratory distress  She has no wheezes  She has no rales  Abdominal: Soft  Bowel sounds are normal  She exhibits no distension and no mass  There is no tenderness  There is no rebound and no guarding  Musculoskeletal: Normal range of motion  She exhibits no edema  Lymphadenopathy:     She has no cervical adenopathy  Neurological: She is alert and oriented to person, place, and time  Skin: Skin is warm and dry  No rash noted  No erythema  No pallor  Psychiatric: She has a normal mood and affect  Her behavior is normal  Judgment and thought content normal         Labs and Imaging:    CT ABDOMEN AND PELVIS WITH IV CONTRAST     INDICATION:   R63 4: Abnormal weight loss  C25 9: Malignant neoplasm of pancreas, unspecified  K86 1: Other chronic pancreatitis      COMPARISON:  None      TECHNIQUE:  CT examination of the abdomen and pelvis was performed  In addition to portal venous phase postcontrast scanning through the abdomen and pelvis, delayed phase postcontrast scanning was performed through the upper abdominal viscera  Axial,   sagittal, and coronal 2D reformatted images were created from the source data and submitted for interpretation      Radiation dose length product (DLP) for this visit:  898 02 mGy-cm     This examination, like all CT scans performed in the [facility], was performed utilizing techniques to minimize radiation dose exposure, including the use of iterative   reconstruction and automated exposure control      IV Contrast:  100 mL of iohexol (OMNIPAQUE)  350  Enteric Contrast:  Enteric contrast was administered      FINDINGS:     ABDOMEN     LOWER CHEST:  Minimal enteric contrast in the distal esophagus may be sequela of gastroesophageal reflux      LIVER/BILIARY TREE:  One or more subcentimeter sharply circumscribed low-density hepatic lesion(s) are noted, too small to accurately characterize, but statistically most likely to represent subcentimeter hepatic cysts  1 8 cm simple cyst peripheral   subcapsular left lobe of liver  No suspicious solid hepatic lesion is identified  Hepatic contours are normal   No biliary dilatation      GALLBLADDER:  No calcified gallstones  No pericholecystic inflammatory change      SPLEEN:  Unremarkable      PANCREAS:  No evidence of abnormal pancreatic mass on portal venous and delayed phase imaging  Subtle lobulation of the head of the pancreas is nonspecific and may be normal for this patient     ADRENAL GLANDS:  2 x 2 cm left adrenal nodule with portal venous and 4 minute delay average density of 42 Hounsfield units and 23 Hounsfield units respectively  While timing of the delayed series is not optimal for adrenal nodule evaluation, relative   washout is suggestive of lipid poor adenoma  Unremarkable right adrenal gland      KIDNEYS/URETERS:  One or more sharply circumscribed subcentimeter renal hypodensities are noted  These lesions are too small to accurately characterize, but are statistically most likely to represent benign cortical renal cyst(s)  According to the   guidelines published in the [facility] Paper of the ACR Incidental Findings Committee (Radiology 2010), no further workup of these lesions is recommended  Additional bilateral renal simple cysts are present, the largest of which measures 5 5 cm arising from   the medial left renal upper pole  Kidneys otherwise unremarkable  No perinephric collection      STOMACH AND BOWEL:  Unremarkable      APPENDIX:  A normal appendix was visualized      ABDOMINOPELVIC CAVITY:  No enlarged lymph nodes  7 mm short axis lymph node immediately inferior to the pancreatic head  No free fluid or free gas      VESSELS:  Aortoiliac calcification  No aneurysm      PELVIS     REPRODUCTIVE ORGANS:  Leiomyomatous uterus including 6 1 x 5 9 cm ventral subserosal fibroid   1 3 cm right ovarian cyst image 58 series 2      URINARY BLADDER:  Unremarkable      ABDOMINAL WALL/INGUINAL REGIONS:  Subcentimeter ventral umbilical abdominal wall diastases containing fat  No bowel herniation  No inguinal hernia or mass      OSSEOUS STRUCTURES:  No acute fracture or osseous destructive lesion identified  Degenerative changes of the spine, pubic symphysis, and multiple joints  Moderate to severe right L4-5 neural foraminal narrowing      IMPRESSION:     No evidence of pancreatic mass insofar as can be detected without arterial phase imaging  Slightly lobulated head of the pancreas may be normal for this patient  If there is strong clinical concern for pancreatic malignancy, advise follow-up with   dedicated pancreatic MRI with and without contrast      No evidence of acute abdominopelvic process      2 x 2 cm left adrenal nodule  Although its imaging features are indeterminate, it does not have suspicious imaging features (heterogeneity, necrosis, irregular margins), therefore this is likely benign, and can be followed by non-contrast abdomen CT or   MRI in 12 months  If patient has history of adrenal hyperfunction, consider biochemical evaluation   This could also be evaluated at time of dedicated pancreatic MRI if that study is obtained      Adrenal recommendation based on institutional consensus and Journal of [facility] of Radiology 2017;14:0669-6172     1 3 x 1 3 cm right ovarian benign appearing cyst   According to current guidelines (J Am Bernard Radiol 2013;10:671-681) in this late postmenopausal woman, this should be evaluated by nonemergent pelvic ultrasound now      Leiomyomatous uterus      This study demonstrates a significant  finding and was documented as such in [facility] for liaison and referring practitioner notification       This study demonstrates a finding requiring imaging follow-up and was logged as such in [address] performed: [id number]

## 2020-03-05 NOTE — PATIENT INSTRUCTIONS
Take dexamethasone 1mg tablet at 11PM, have 8AM cortisol checked the following morning    Repeat diabetes labs in 2 months    Have MRI of the abdomen done to evaluate pancreas and adrenal lesion    See Gastroenterology    Follow up in 2 months

## 2020-03-25 DIAGNOSIS — E11.9 TYPE 2 DIABETES MELLITUS WITHOUT COMPLICATION, WITH LONG-TERM CURRENT USE OF INSULIN (HCC): ICD-10-CM

## 2020-03-25 DIAGNOSIS — Z79.4 TYPE 2 DIABETES MELLITUS WITHOUT COMPLICATION, WITH LONG-TERM CURRENT USE OF INSULIN (HCC): ICD-10-CM

## 2020-04-17 ENCOUNTER — TRANSCRIBE ORDERS (OUTPATIENT)
Dept: ADMINISTRATIVE | Facility: HOSPITAL | Age: 60
End: 2020-04-17

## 2020-04-17 DIAGNOSIS — Z12.31 ENCOUNTER FOR SCREENING MAMMOGRAM FOR MALIGNANT NEOPLASM OF BREAST: Primary | ICD-10-CM

## 2020-05-01 ENCOUNTER — TELEPHONE (OUTPATIENT)
Dept: ENDOCRINOLOGY | Facility: CLINIC | Age: 60
End: 2020-05-01

## 2020-05-05 LAB
ALBUMIN SERPL-MCNC: 4.1 G/DL (ref 3.8–4.8)
ALBUMIN/GLOB SERPL: 1.5 {RATIO} (ref 1.2–2.2)
ALP SERPL-CCNC: 80 IU/L (ref 39–117)
ALT SERPL-CCNC: 16 IU/L (ref 0–32)
AST SERPL-CCNC: 18 IU/L (ref 0–40)
BILIRUB SERPL-MCNC: 0.3 MG/DL (ref 0–1.2)
BUN SERPL-MCNC: 12 MG/DL (ref 8–27)
BUN/CREAT SERPL: 13 (ref 12–28)
CALCIUM SERPL-MCNC: 10.4 MG/DL (ref 8.7–10.3)
CHLORIDE SERPL-SCNC: 103 MMOL/L (ref 96–106)
CHOLEST SERPL-MCNC: 226 MG/DL (ref 100–199)
CO2 SERPL-SCNC: 23 MMOL/L (ref 20–29)
CREAT SERPL-MCNC: 0.91 MG/DL (ref 0.57–1)
GLOBULIN SER-MCNC: 2.8 G/DL (ref 1.5–4.5)
GLUCOSE SERPL-MCNC: 177 MG/DL (ref 65–99)
HBA1C MFR BLD: 5.4 % (ref 4.8–5.6)
HDLC SERPL-MCNC: 49 MG/DL
LDLC SERPL CALC-MCNC: 160 MG/DL (ref 0–99)
POTASSIUM SERPL-SCNC: 4.4 MMOL/L (ref 3.5–5.2)
PROT SERPL-MCNC: 6.9 G/DL (ref 6–8.5)
SL AMB EGFR AFRICAN AMERICAN: 79 ML/MIN/1.73
SL AMB EGFR NON AFRICAN AMERICAN: 68 ML/MIN/1.73
SL AMB VLDL CHOLESTEROL CALC: 17 MG/DL (ref 5–40)
SODIUM SERPL-SCNC: 140 MMOL/L (ref 134–144)
TRIGL SERPL-MCNC: 83 MG/DL (ref 0–149)

## 2020-05-12 ENCOUNTER — TELEPHONE (OUTPATIENT)
Dept: ENDOCRINOLOGY | Facility: CLINIC | Age: 60
End: 2020-05-12

## 2020-05-13 ENCOUNTER — TELEMEDICINE (OUTPATIENT)
Dept: ENDOCRINOLOGY | Facility: CLINIC | Age: 60
End: 2020-05-13
Payer: COMMERCIAL

## 2020-05-13 ENCOUNTER — TELEPHONE (OUTPATIENT)
Dept: ENDOCRINOLOGY | Facility: CLINIC | Age: 60
End: 2020-05-13

## 2020-05-13 DIAGNOSIS — E78.00 PURE HYPERCHOLESTEROLEMIA: ICD-10-CM

## 2020-05-13 DIAGNOSIS — I10 HYPERTENSION GOAL BP (BLOOD PRESSURE) < 140/90: ICD-10-CM

## 2020-05-13 DIAGNOSIS — E11.65 TYPE 2 DIABETES MELLITUS WITH HYPERGLYCEMIA, WITH LONG-TERM CURRENT USE OF INSULIN (HCC): Primary | ICD-10-CM

## 2020-05-13 DIAGNOSIS — E11.65 TYPE 2 DIABETES MELLITUS WITH HYPERGLYCEMIA, WITH LONG-TERM CURRENT USE OF INSULIN (HCC): ICD-10-CM

## 2020-05-13 DIAGNOSIS — E55.9 VITAMIN D DEFICIENCY: ICD-10-CM

## 2020-05-13 DIAGNOSIS — K86.9 LESION OF PANCREAS: ICD-10-CM

## 2020-05-13 DIAGNOSIS — Z79.4 TYPE 2 DIABETES MELLITUS WITH HYPERGLYCEMIA, WITH LONG-TERM CURRENT USE OF INSULIN (HCC): Primary | ICD-10-CM

## 2020-05-13 DIAGNOSIS — E27.9 LESION OF ADRENAL GLAND (HCC): ICD-10-CM

## 2020-05-13 DIAGNOSIS — E27.40 ADRENAL INSUFFICIENCY (HCC): Primary | ICD-10-CM

## 2020-05-13 DIAGNOSIS — Z79.4 TYPE 2 DIABETES MELLITUS WITH HYPERGLYCEMIA, WITH LONG-TERM CURRENT USE OF INSULIN (HCC): ICD-10-CM

## 2020-05-13 PROCEDURE — 99214 OFFICE O/P EST MOD 30 MIN: CPT | Performed by: INTERNAL MEDICINE

## 2020-05-13 RX ORDER — SIMVASTATIN 20 MG
20 TABLET ORAL
Qty: 90 TABLET | Refills: 1 | Status: SHIPPED | OUTPATIENT
Start: 2020-05-13 | End: 2020-11-16

## 2020-05-13 RX ORDER — DEXAMETHASONE 1 MG
TABLET ORAL
Qty: 1 TABLET | Refills: 0 | Status: SHIPPED | OUTPATIENT
Start: 2020-05-13 | End: 2020-06-26

## 2020-05-18 ENCOUNTER — HOSPITAL ENCOUNTER (OUTPATIENT)
Dept: RADIOLOGY | Facility: HOSPITAL | Age: 60
Discharge: HOME | End: 2020-05-18

## 2020-05-27 LAB
ALDOST SERPL-MCNC: 6.2 NG/DL (ref 0–30)
ALDOST/RENIN PLAS-RTO: 12.1 {RATIO} (ref 0–30)
CORTIS AM PEAK SERPL-MCNC: 1.3 UG/DL (ref 6.2–19.4)
METANEPH FREE SERPL-MCNC: 51 PG/ML (ref 0–62)
NORMETANEPHRINE SERPL-MCNC: 137 PG/ML (ref 0–145)
RENIN PLAS-CCNC: 0.51 NG/ML/HR (ref 0.17–5.38)

## 2020-05-28 ENCOUNTER — TELEPHONE (OUTPATIENT)
Dept: ENDOCRINOLOGY | Facility: CLINIC | Age: 60
End: 2020-05-28

## 2020-06-26 ENCOUNTER — OFFICE VISIT (OUTPATIENT)
Dept: ENDOCRINOLOGY | Facility: CLINIC | Age: 60
End: 2020-06-26
Payer: COMMERCIAL

## 2020-06-26 VITALS
WEIGHT: 191 LBS | BODY MASS INDEX: 32.61 KG/M2 | HEART RATE: 67 BPM | HEIGHT: 64 IN | SYSTOLIC BLOOD PRESSURE: 120 MMHG | DIASTOLIC BLOOD PRESSURE: 88 MMHG

## 2020-06-26 DIAGNOSIS — Z79.4 TYPE 2 DIABETES MELLITUS WITH HYPERGLYCEMIA, WITH LONG-TERM CURRENT USE OF INSULIN (HCC): Primary | ICD-10-CM

## 2020-06-26 DIAGNOSIS — I10 HYPERTENSION GOAL BP (BLOOD PRESSURE) < 140/90: ICD-10-CM

## 2020-06-26 DIAGNOSIS — E27.9 LESION OF ADRENAL GLAND (HCC): ICD-10-CM

## 2020-06-26 DIAGNOSIS — E11.65 TYPE 2 DIABETES MELLITUS WITH HYPERGLYCEMIA, WITH LONG-TERM CURRENT USE OF INSULIN (HCC): Primary | ICD-10-CM

## 2020-06-26 DIAGNOSIS — E78.00 PURE HYPERCHOLESTEROLEMIA: ICD-10-CM

## 2020-06-26 DIAGNOSIS — E55.9 VITAMIN D DEFICIENCY: ICD-10-CM

## 2020-06-26 PROCEDURE — 99214 OFFICE O/P EST MOD 30 MIN: CPT | Performed by: INTERNAL MEDICINE

## 2020-07-28 ENCOUNTER — HOSPITAL ENCOUNTER (OUTPATIENT)
Dept: RADIOLOGY | Facility: HOSPITAL | Age: 60
Discharge: HOME | End: 2020-07-28
Payer: COMMERCIAL

## 2020-07-28 VITALS — WEIGHT: 192 LBS | HEIGHT: 64 IN | BODY MASS INDEX: 32.78 KG/M2

## 2020-07-28 DIAGNOSIS — Z12.31 ENCOUNTER FOR SCREENING MAMMOGRAM FOR MALIGNANT NEOPLASM OF BREAST: ICD-10-CM

## 2020-07-28 LAB — HBA1C MFR BLD HPLC: 6.1 %

## 2020-07-28 PROCEDURE — 77063 BREAST TOMOSYNTHESIS BI: CPT

## 2020-07-28 PROCEDURE — 77067 SCR MAMMO BI INCL CAD: CPT

## 2020-07-29 ENCOUNTER — TELEPHONE (OUTPATIENT)
Dept: ENDOCRINOLOGY | Facility: CLINIC | Age: 60
End: 2020-07-29

## 2020-07-29 NOTE — TELEPHONE ENCOUNTER
----- Message from Sonya Loja sent at 7/29/2020  9:32 AM EDT -----      ----- Message -----  From: Ruthy Cervantes  Sent: 7/29/2020   9:22 AM EDT  To: , #

## 2020-07-30 LAB
25(OH)D3+25(OH)D2 SERPL-MCNC: 33.2 NG/ML (ref 30–100)
ALBUMIN SERPL-MCNC: 4.2 G/DL (ref 3.8–4.8)
ALBUMIN/CREAT UR: 10 MG/G CREAT (ref 0–29)
ALBUMIN/GLOB SERPL: 1.6 {RATIO} (ref 1.2–2.2)
ALP SERPL-CCNC: 78 IU/L (ref 39–117)
ALT SERPL-CCNC: 18 IU/L (ref 0–32)
AST SERPL-CCNC: 19 IU/L (ref 0–40)
BILIRUB SERPL-MCNC: 0.4 MG/DL (ref 0–1.2)
BUN SERPL-MCNC: 10 MG/DL (ref 8–27)
BUN/CREAT SERPL: 11 (ref 12–28)
CALCIUM SERPL-MCNC: 10 MG/DL (ref 8.7–10.3)
CHLORIDE SERPL-SCNC: 102 MMOL/L (ref 96–106)
CHOLEST SERPL-MCNC: 188 MG/DL (ref 100–199)
CO2 SERPL-SCNC: 25 MMOL/L (ref 20–29)
CREAT SERPL-MCNC: 0.93 MG/DL (ref 0.57–1)
CREAT UR-MCNC: 71.6 MG/DL
GLOBULIN SER-MCNC: 2.6 G/DL (ref 1.5–4.5)
GLUCOSE SERPL-MCNC: 136 MG/DL (ref 65–99)
HBA1C MFR BLD: 6.1 % (ref 4.8–5.6)
HDLC SERPL-MCNC: 42 MG/DL
LDLC SERPL CALC-MCNC: 124 MG/DL (ref 0–99)
MICROALBUMIN UR-MCNC: 7.1 UG/ML
POTASSIUM SERPL-SCNC: 4 MMOL/L (ref 3.5–5.2)
PROT SERPL-MCNC: 6.8 G/DL (ref 6–8.5)
SL AMB EGFR AFRICAN AMERICAN: 77 ML/MIN/1.73
SL AMB EGFR NON AFRICAN AMERICAN: 67 ML/MIN/1.73
SL AMB VLDL CHOLESTEROL CALC: 22 MG/DL (ref 5–40)
SODIUM SERPL-SCNC: 143 MMOL/L (ref 134–144)
TRIGL SERPL-MCNC: 111 MG/DL (ref 0–149)

## 2020-07-31 ENCOUNTER — TELEPHONE (OUTPATIENT)
Dept: ENDOCRINOLOGY | Facility: CLINIC | Age: 60
End: 2020-07-31

## 2020-07-31 NOTE — TELEPHONE ENCOUNTER
Called and spoke to patient, discussed lab results  Advised she can stop taking the Lantus altogether  Continue glimepiride and Charlesuvia   pt understood

## 2020-07-31 NOTE — TELEPHONE ENCOUNTER
Please let patient know her labs    Her LDL bad cholesterol decreased to 124, continue taking her simvastatin regularly    Vitamin-D level was normal at 33    Her hemoglobin A1c was great at 6 1%, go ahead and stop the Lantus insulin altogether, continue just glimepiride 2 mg once a day and Januvia 100 mg once a day

## 2020-08-05 ENCOUNTER — HOSPITAL ENCOUNTER (OUTPATIENT)
Dept: RADIOLOGY | Facility: HOSPITAL | Age: 60
Discharge: HOME | End: 2020-08-05
Payer: COMMERCIAL

## 2020-08-05 DIAGNOSIS — R92.8 ABNORMAL MAMMOGRAM: ICD-10-CM

## 2020-08-05 PROCEDURE — 76642 ULTRASOUND BREAST LIMITED: CPT

## 2020-08-11 DIAGNOSIS — E11.9 TYPE 2 DIABETES MELLITUS WITHOUT COMPLICATION, WITH LONG-TERM CURRENT USE OF INSULIN (HCC): ICD-10-CM

## 2020-08-11 DIAGNOSIS — Z79.4 TYPE 2 DIABETES MELLITUS WITHOUT COMPLICATION, WITH LONG-TERM CURRENT USE OF INSULIN (HCC): ICD-10-CM

## 2020-08-11 RX ORDER — BLOOD SUGAR DIAGNOSTIC
STRIP MISCELLANEOUS
Qty: 200 EACH | Refills: 3 | Status: SHIPPED | OUTPATIENT
Start: 2020-08-11

## 2020-09-20 DIAGNOSIS — E11.65 TYPE 2 DIABETES MELLITUS WITH HYPERGLYCEMIA, WITH LONG-TERM CURRENT USE OF INSULIN (HCC): ICD-10-CM

## 2020-09-20 DIAGNOSIS — Z79.4 TYPE 2 DIABETES MELLITUS WITH HYPERGLYCEMIA, WITH LONG-TERM CURRENT USE OF INSULIN (HCC): ICD-10-CM

## 2020-09-21 RX ORDER — SITAGLIPTIN 100 MG/1
TABLET, FILM COATED ORAL
Qty: 90 TABLET | Refills: 3 | Status: SHIPPED | OUTPATIENT
Start: 2020-09-21 | End: 2022-06-14

## 2020-09-25 DIAGNOSIS — E55.9 VITAMIN D DEFICIENCY: ICD-10-CM

## 2020-09-25 RX ORDER — MELATONIN
Qty: 90 TABLET | Refills: 3 | Status: SHIPPED | OUTPATIENT
Start: 2020-09-25

## 2020-11-14 DIAGNOSIS — E78.00 PURE HYPERCHOLESTEROLEMIA: ICD-10-CM

## 2020-11-16 RX ORDER — SIMVASTATIN 20 MG
TABLET ORAL
Qty: 90 TABLET | Refills: 1 | Status: SHIPPED | OUTPATIENT
Start: 2020-11-16

## 2021-03-15 ENCOUNTER — TRANSCRIBE ORDERS (OUTPATIENT)
Dept: ADMINISTRATIVE | Facility: HOSPITAL | Age: 61
End: 2021-03-15

## 2021-03-15 DIAGNOSIS — Z09 FOLLOW UP: Primary | ICD-10-CM

## 2021-03-15 DIAGNOSIS — R92.8 ABNORMAL MAMMOGRAM: ICD-10-CM

## 2021-03-31 ENCOUNTER — HOSPITAL ENCOUNTER (OUTPATIENT)
Dept: RADIOLOGY | Facility: HOSPITAL | Age: 61
Discharge: HOME | End: 2021-03-31
Payer: COMMERCIAL

## 2021-03-31 VITALS — HEIGHT: 64 IN | WEIGHT: 192 LBS | BODY MASS INDEX: 32.78 KG/M2

## 2021-03-31 DIAGNOSIS — R92.8 ABNORMAL MAMMOGRAM: ICD-10-CM

## 2021-03-31 DIAGNOSIS — Z09 FOLLOW UP: ICD-10-CM

## 2021-03-31 PROCEDURE — G0279 TOMOSYNTHESIS, MAMMO: HCPCS

## 2021-03-31 PROCEDURE — 76642 ULTRASOUND BREAST LIMITED: CPT

## 2021-03-31 PROCEDURE — 77065 DX MAMMO INCL CAD UNI: CPT

## 2021-04-12 ENCOUNTER — OFFICE VISIT (OUTPATIENT)
Dept: ENDOCRINOLOGY | Facility: CLINIC | Age: 61
End: 2021-04-12
Payer: COMMERCIAL

## 2021-04-12 VITALS
TEMPERATURE: 97 F | HEIGHT: 64 IN | WEIGHT: 192 LBS | BODY MASS INDEX: 32.78 KG/M2 | HEART RATE: 87 BPM | DIASTOLIC BLOOD PRESSURE: 82 MMHG | SYSTOLIC BLOOD PRESSURE: 150 MMHG

## 2021-04-12 DIAGNOSIS — I10 HYPERTENSION GOAL BP (BLOOD PRESSURE) < 140/90: ICD-10-CM

## 2021-04-12 DIAGNOSIS — E78.00 PURE HYPERCHOLESTEROLEMIA: Primary | ICD-10-CM

## 2021-04-12 DIAGNOSIS — E27.9 LESION OF ADRENAL GLAND (HCC): ICD-10-CM

## 2021-04-12 DIAGNOSIS — E55.9 VITAMIN D DEFICIENCY: ICD-10-CM

## 2021-04-12 DIAGNOSIS — Z79.4 TYPE 2 DIABETES MELLITUS WITH HYPERGLYCEMIA, WITH LONG-TERM CURRENT USE OF INSULIN (HCC): ICD-10-CM

## 2021-04-12 DIAGNOSIS — E11.65 TYPE 2 DIABETES MELLITUS WITH HYPERGLYCEMIA, WITH LONG-TERM CURRENT USE OF INSULIN (HCC): ICD-10-CM

## 2021-04-12 PROCEDURE — 99214 OFFICE O/P EST MOD 30 MIN: CPT | Performed by: INTERNAL MEDICINE

## 2021-04-12 RX ORDER — DEXAMETHASONE 1 MG
TABLET ORAL
Qty: 1 TABLET | Refills: 0 | Status: SHIPPED | OUTPATIENT
Start: 2021-04-12

## 2021-04-12 RX ORDER — PANTOPRAZOLE SODIUM 40 MG/1
TABLET, DELAYED RELEASE ORAL
COMMUNITY
Start: 2021-01-07

## 2021-04-12 RX ORDER — AMOXICILLIN AND CLAVULANATE POTASSIUM 875; 125 MG/1; MG/1
TABLET, FILM COATED ORAL
COMMUNITY
Start: 2021-03-08

## 2021-04-12 NOTE — PROGRESS NOTES
ENDOCRINOLOGY  FOLLOW UP VISIT      Reason for Endocrine Consult/Chief Complaint: DM follow up   ? Medical Decision Making:     Impression  1  DM2   2  HTN  3  HLD   4  Vitamin D deficiency  5  Low TSH- now resolved   6  Left adrenal adenoma 2cm left     Recommendations:     Continue glimepiride 2mg qday and Januvia 100mg qday       She is due for all of her diabetes labs now      HTN- continue ACEi, mildly elevated BP today but only recently took her medication, repeat at next visit for re-evaluation      HLD- , triglycerides 111 July 2020 continue statin, repeat lipids now      Vitamin D deficiency- continue D3 replacement 1000 IU daily, level replete 33 July 2020, repeat level now      Adrenal lesion 2cm left-  MRI abdomen 4/2020 confirms adenoma, hypersecretion labs normal, repeat CT without contrast for size re-evaluation now, repeat dexamethasone suppression testing now to re-evaluate for subclinical cushing's            RTC 4 months   Effie ARANGO            History of Present Illness:   Mrs Yudy Wesley is a 54yr old female who presents for DM management-follow up       PMH-DM2, HLD, HTN, vitamin D deficiency   PSH-shoulder surgery   FHx-DM in brothers and sister   SHx-+smoking 1ppd, social ETOH use, retired [employer] supervisor      Type of DM: 2  Age of onset:6 years hospitalized at [facility]  Microvascular complications:none known   Macrovascular complications: none known         Events since last visit:   Presently on amaryl 2mg qday and januvia 100mg qday     FBG- is not checking BG at home  Premeal/qHS BG-is not checking BG at home  Hypoglycemia- none        ? Review of Systems:     Review of Systems   Constitutional: Negative for appetite change, chills, diaphoresis, fatigue, fever and unexpected weight change  HENT: Negative for congestion, ear pain, hearing loss, rhinorrhea, sinus pressure, sinus pain, sore throat, trouble swallowing and voice change      Eyes: Negative for photophobia, redness and visual disturbance  Respiratory: Negative for apnea, cough, chest tightness, shortness of breath, wheezing and stridor  Cardiovascular: Negative for chest pain, palpitations and leg swelling  Gastrointestinal: Negative for abdominal distention, abdominal pain, constipation, diarrhea, nausea and vomiting  Endocrine: Negative for cold intolerance, heat intolerance, polydipsia, polyphagia and polyuria  Genitourinary: Negative for difficulty urinating, dysuria, flank pain, frequency, hematuria and urgency  Musculoskeletal: Negative for arthralgias, back pain, gait problem, joint swelling and myalgias  Skin: Negative for color change, pallor, rash and wound  Allergic/Immunologic: Negative for immunocompromised state  Neurological: Negative for dizziness, tremors, syncope, weakness, light-headedness and headaches  Hematological: Negative for adenopathy  Does not bruise/bleed easily  Psychiatric/Behavioral: Negative for confusion and sleep disturbance  The patient is not nervous/anxious  ? Patient History:     Past Medical History:   Diagnosis Date    [facility]     Hyperlipidemia     Hypertension      Past Surgical History:   Procedure Laterality Date    ROTATOR CUFF REPAIR       Social History     Socioeconomic History    Marital status:       Spouse name: Not on file    Number of children: Not on file    Years of education: Not on file    Highest education level: Not on file   Occupational History    Not on file   Social Needs    Financial resource strain: Not on file    Food insecurity     Worry: Not on file     Inability: Not on file    Transportation needs     Medical: Not on file     Non-medical: Not on file   Tobacco Use    Smoking status: Current Every Day Smoker     Packs/day: 0 50     Types: Cigarettes    Smokeless tobacco: Never Used   Substance and Sexual Activity    Alcohol use: Yes     Comment: socially    Drug use: No    Sexual activity: Not on file   Lifestyle    Physical activity     Days per week: Not on file     Minutes per session: Not on file    Stress: Not on file   Relationships    Social connections     Talks on phone: Not on file     Gets together: Not on file     Attends Alevism service: Not on file     Active member of club or organization: Not on file     Attends meetings of clubs or organizations: Not on file     Relationship status: Not on file    Intimate partner violence     Fear of current or ex partner: Not on file     Emotionally abused: Not on file     Physically abused: Not on file     Forced sexual activity: Not on file   Other Topics Concern    Not on file   Social History Narrative    Not on file     Family History   Problem Relation Age of Onset    Lung cancer Father 52        smoker    Endometrial cancer Sister 61    Diabetes Mother     Heart failure Mother     Breast cancer Sister 58    No Known Problems Sister     No Known Problems Sister     No Known Problems Sister     No Known Problems Sister     No Known Problems Maternal Aunt     No Known Problems Maternal Aunt     No Known Problems Paternal Aunt        Current Medications: At the time this note was written these were the medications the patient was on    Current Outpatient Medications   Medication Sig Dispense Refill    cholecalciferol (VITAMIN D3) 1,000 units tablet take 1 tablet by mouth once daily 90 tablet 3    glimepiride (AMARYL) 1 mg tablet Take 1 tablet in the morning with breakfast  E11 65 90 tablet 3    ibuprofen (MOTRIN) 800 mg tablet Take by mouth      JANUVIA 100 MG tablet       Lancets (ONETOUCH ULTRASOFT) lancets USE TO TEST BLOOD SUGAR 2  TIMES A  each 0    Lancets (ONETOUCH ULTRASOFT) lancets       lisinopril (ZESTRIL) 20 mg tablet Take 20 mg by mouth daily  0    Multiple Vitamins-Minerals (CENTRUM SILVER 50+WOMEN) TABS Take by mouth      ONE TOUCH ULTRA TEST test strip       OneTouch Ultra test strip CHECK BLOOD SUGAR TWICE  DAILY 200 each 3    pantoprazole (PROTONIX) 40 mg tablet       simvastatin (ZOCOR) 20 mg tablet take 1 tablet by mouth at bedtime 90 tablet 1    amoxicillin-clavulanate (AUGMENTIN) 875-125 mg per tablet       buPROPion (ZYBAN) 150 MG 12 hr tablet Take 150 mg by mouth daily  2    fluconazole (DIFLUCAN) 150 mg tablet Take 150 mg by mouth daily  0    fluconazole (DIFLUCAN) 200 mg tablet TAKE 1 TABLET BY MOUTH ONE TIME  0    IMVEXXY MAINTENANCE PACK 4 MCG INST       insulin glargine (Lantus SoloStar) 100 units/mL injection pen Inject 20 Units under the skin daily (Patient not taking: Reported on 4/12/2021) 30 mL 3    Januvia 100 MG tablet TAKE 1 TABLET BY MOUTH  DAILY (Patient not taking: Reported on 4/12/2021) 90 tablet 3    LANTUS SOLOSTAR 100 units/mL injection pen       lisinopril (ZESTRIL) 20 mg tablet       lisinopril (ZESTRIL) 30 mg tablet Take by mouth      PROAIR  (90 Base) MCG/ACT inhaler Inhale 2 puffs 2 (two) times a day  2    RABEprazole (ACIPHEX) 20 MG tablet        No current facility-administered medications for this visit  Allergies: Patient has no known allergies  Physical Exam:   Vital Signs:   /82   Pulse 87   Temp (!) 97 °F (36 1 °C)   Ht 5' 4" (1 626 m)   Wt 87 1 kg (192 lb)   BMI 32 96 kg/m²     Physical Exam  Vitals signs reviewed  Constitutional:       General: She is not in acute distress  Appearance: Normal appearance  She is not ill-appearing, toxic-appearing or diaphoretic  HENT:      Head: Normocephalic and atraumatic  Right Ear: External ear normal       Left Ear: External ear normal       Nose: Nose normal    Eyes:      General: No scleral icterus  Extraocular Movements: Extraocular movements intact  Conjunctiva/sclera: Conjunctivae normal    Neck:      Musculoskeletal: Normal range of motion and neck supple  No muscular tenderness        Comments: No thyromegaly or nodules  Cardiovascular:      Rate and Rhythm: Normal rate and regular rhythm  Heart sounds: Normal heart sounds  No murmur  No friction rub  No gallop  Pulmonary:      Effort: Pulmonary effort is normal  No respiratory distress  Breath sounds: Normal breath sounds  No stridor  No wheezing, rhonchi or rales  Abdominal:      General: Bowel sounds are normal  There is no distension  Palpations: Abdomen is soft  There is no mass  Tenderness: There is no abdominal tenderness  There is no guarding or rebound  Hernia: No hernia is present  Musculoskeletal: Normal range of motion  General: No swelling  Lymphadenopathy:      Cervical: No cervical adenopathy  Skin:     General: Skin is warm and dry  Coloration: Skin is not pale  Findings: No erythema or rash  Neurological:      General: No focal deficit present  Mental Status: She is alert and oriented to person, place, and time  Psychiatric:         Mood and Affect: Mood normal          Behavior: Behavior normal          Thought Content:  Thought content normal          Judgment: Judgment normal             Labs and Imaging:    No recent labs

## 2021-04-12 NOTE — PATIENT INSTRUCTIONS
Have labs done now    Take the dexamethasone pill at 11PM and have labs done at [address] [place] [address] the following morning    Have CT scan done of the abdomen to re-evaluate the adrenal        Follow up in 4 months

## 2021-04-19 ENCOUNTER — TELEPHONE (OUTPATIENT)
Dept: ENDOCRINOLOGY | Facility: CLINIC | Age: 61
End: 2021-04-19

## 2021-04-20 ENCOUNTER — TELEPHONE (OUTPATIENT)
Dept: ENDOCRINOLOGY | Facility: CLINIC | Age: 61
End: 2021-04-20

## 2021-04-20 LAB
ALBUMIN SERPL-MCNC: 4.4 G/DL (ref 3.8–4.8)
ALBUMIN/GLOB SERPL: 1.6 {RATIO} (ref 1.2–2.2)
ALP SERPL-CCNC: 85 IU/L (ref 39–117)
ALT SERPL-CCNC: 17 IU/L (ref 0–32)
AST SERPL-CCNC: 15 IU/L (ref 0–40)
BILIRUB SERPL-MCNC: 0.6 MG/DL (ref 0–1.2)
BUN SERPL-MCNC: 15 MG/DL (ref 8–27)
BUN/CREAT SERPL: 19 (ref 12–28)
CALCIUM SERPL-MCNC: 10.4 MG/DL (ref 8.7–10.3)
CHLORIDE SERPL-SCNC: 101 MMOL/L (ref 96–106)
CO2 SERPL-SCNC: 25 MMOL/L (ref 20–29)
CREAT SERPL-MCNC: 0.81 MG/DL (ref 0.57–1)
GLOBULIN SER-MCNC: 2.7 G/DL (ref 1.5–4.5)
GLUCOSE SERPL-MCNC: 161 MG/DL (ref 65–99)
HBA1C MFR BLD: 7.6 % (ref 4.8–5.6)
POTASSIUM SERPL-SCNC: 4.4 MMOL/L (ref 3.5–5.2)
PROT SERPL-MCNC: 7.1 G/DL (ref 6–8.5)
SL AMB EGFR AFRICAN AMERICAN: 90 ML/MIN/1.73
SL AMB EGFR NON AFRICAN AMERICAN: 78 ML/MIN/1.73
SODIUM SERPL-SCNC: 140 MMOL/L (ref 134–144)

## 2021-04-20 NOTE — TELEPHONE ENCOUNTER
Please let pt know A1C increased slightly to 7 6%    Calcium level mildly elevated 10 4 but corrected calcium level normal 10 1 will continue to monitor

## 2021-04-23 ENCOUNTER — TELEPHONE (OUTPATIENT)
Dept: ENDOCRINOLOGY | Facility: CLINIC | Age: 61
End: 2021-04-23

## 2021-04-27 ENCOUNTER — TELEPHONE (OUTPATIENT)
Dept: ENDOCRINOLOGY | Facility: CLINIC | Age: 61
End: 2021-04-27

## 2021-04-27 NOTE — TELEPHONE ENCOUNTER
Cancelled patient's cat scan since it was denied by her insurance  Dr Annelle Hatchet will apeal   Will call patient when approved

## 2021-05-17 ENCOUNTER — TELEPHONE (OUTPATIENT)
Dept: ENDOCRINOLOGY | Facility: CLINIC | Age: 61
End: 2021-05-17

## 2021-05-17 NOTE — TELEPHONE ENCOUNTER
CT scan non contrast denied for 2nd time    Appealed again, spoke with Dr Nia Dominguez radiologist through [facility] for peer-peer review    [employer] guidelines do not recommend re-imaging adrenal benign adenomas       I explained AACE guideline from 2009 for adrenal incidentaloma recommends the following:    Patients with adrenal incidentalomas smaller than 4   cm and radiologic characteristics consistent with a benign  adenoma need to have radiographic reevaluation at 3 to 6  months and then annually for 1 to 2 years    Study still not approved    Will try to do peer-peer with an endocrinologist

## 2021-06-01 DIAGNOSIS — E78.00 PURE HYPERCHOLESTEROLEMIA: ICD-10-CM

## 2021-06-01 DIAGNOSIS — E55.9 VITAMIN D DEFICIENCY: ICD-10-CM

## 2021-06-01 DIAGNOSIS — I10 HYPERTENSION GOAL BP (BLOOD PRESSURE) < 140/90: ICD-10-CM

## 2021-06-01 DIAGNOSIS — Z79.4 TYPE 2 DIABETES MELLITUS WITHOUT COMPLICATION, WITH LONG-TERM CURRENT USE OF INSULIN (HCC): ICD-10-CM

## 2021-06-01 DIAGNOSIS — E11.9 TYPE 2 DIABETES MELLITUS WITHOUT COMPLICATION, WITH LONG-TERM CURRENT USE OF INSULIN (HCC): ICD-10-CM

## 2021-06-02 DIAGNOSIS — Z79.4 TYPE 2 DIABETES MELLITUS WITH HYPERGLYCEMIA, WITH LONG-TERM CURRENT USE OF INSULIN (HCC): ICD-10-CM

## 2021-06-02 DIAGNOSIS — E11.65 TYPE 2 DIABETES MELLITUS WITH HYPERGLYCEMIA, WITH LONG-TERM CURRENT USE OF INSULIN (HCC): ICD-10-CM

## 2021-06-02 RX ORDER — LISINOPRIL 20 MG/1
TABLET ORAL
Qty: 90 TABLET | Refills: 0 | Status: SHIPPED | OUTPATIENT
Start: 2021-06-02

## 2021-06-02 RX ORDER — GLIMEPIRIDE 2 MG/1
TABLET ORAL
Qty: 90 TABLET | Refills: 0 | Status: SHIPPED | OUTPATIENT
Start: 2021-06-02 | End: 2022-03-08

## 2021-06-02 RX ORDER — GLIMEPIRIDE 2 MG/1
TABLET ORAL
Qty: 270 TABLET | Refills: 3 | OUTPATIENT
Start: 2021-06-02

## 2021-06-07 NOTE — TELEPHONE ENCOUNTER
2nd appeal done today with endocrinologist Dr Dominique Frames    She will approve the CT scan without contrast    Approval letter to be sent to our office in the next few days    Once received notify patient can schedule CT scan

## 2021-06-08 ENCOUNTER — TELEPHONE (OUTPATIENT)
Dept: ENDOCRINOLOGY | Facility: CLINIC | Age: 61
End: 2021-06-08

## 2021-06-08 NOTE — TELEPHONE ENCOUNTER
Left message to call the office to let her know to schedule her CT scan    It was approved      Approval letter from [address] [place] [address] for #[id number] CT scan Auth #[id number]

## 2021-06-28 ENCOUNTER — HOSPITAL ENCOUNTER (OUTPATIENT)
Dept: RADIOLOGY | Facility: HOSPITAL | Age: 61
Discharge: HOME | End: 2021-06-28
Attending: INTERNAL MEDICINE
Payer: COMMERCIAL

## 2021-06-28 DIAGNOSIS — E11.65 TYPE 2 DIABETES MELLITUS WITH HYPERGLYCEMIA, WITH LONG-TERM CURRENT USE OF INSULIN (HCC): ICD-10-CM

## 2021-06-28 DIAGNOSIS — E55.9 VITAMIN D DEFICIENCY: ICD-10-CM

## 2021-06-28 DIAGNOSIS — Z79.4 TYPE 2 DIABETES MELLITUS WITH HYPERGLYCEMIA, WITH LONG-TERM CURRENT USE OF INSULIN (HCC): ICD-10-CM

## 2021-06-28 DIAGNOSIS — I10 HYPERTENSION GOAL BP (BLOOD PRESSURE) < 140/90: ICD-10-CM

## 2021-06-28 DIAGNOSIS — E27.9 LESION OF ADRENAL GLAND (HCC): ICD-10-CM

## 2021-06-28 DIAGNOSIS — E78.00 PURE HYPERCHOLESTEROLEMIA: ICD-10-CM

## 2021-06-28 PROCEDURE — G1004 CDSM NDSC: HCPCS

## 2021-06-28 PROCEDURE — 74150 CT ABDOMEN W/O CONTRAST: CPT

## 2021-06-29 ENCOUNTER — TELEPHONE (OUTPATIENT)
Dept: ENDOCRINOLOGY | Facility: CLINIC | Age: 61
End: 2021-06-29

## 2021-06-29 NOTE — TELEPHONE ENCOUNTER
----- Message from Scottie Pacheco MD sent at 6/29/2021 11:37 AM EDT -----  Covering for Dr Rex Ramirez    Stable 2x2 cm  left adrenal nodule

## 2021-11-17 ENCOUNTER — HOSPITAL ENCOUNTER (OUTPATIENT)
Dept: RADIOLOGY | Facility: HOSPITAL | Age: 61
Discharge: HOME | End: 2021-11-17
Payer: COMMERCIAL

## 2021-11-17 VITALS — WEIGHT: 185 LBS | HEIGHT: 62 IN | BODY MASS INDEX: 34.04 KG/M2

## 2021-11-17 DIAGNOSIS — Z09 FOLLOW-UP EXAM, 3-6 MONTHS SINCE PREVIOUS EXAM: ICD-10-CM

## 2021-11-17 PROCEDURE — 77066 DX MAMMO INCL CAD BI: CPT

## 2021-11-17 PROCEDURE — 76642 ULTRASOUND BREAST LIMITED: CPT

## 2021-11-17 PROCEDURE — G0279 TOMOSYNTHESIS, MAMMO: HCPCS

## 2022-03-08 ENCOUNTER — OFFICE VISIT (OUTPATIENT)
Dept: ENDOCRINOLOGY | Facility: CLINIC | Age: 62
End: 2022-03-08
Payer: COMMERCIAL

## 2022-03-08 VITALS
WEIGHT: 182 LBS | HEIGHT: 62 IN | TEMPERATURE: 97 F | DIASTOLIC BLOOD PRESSURE: 80 MMHG | SYSTOLIC BLOOD PRESSURE: 130 MMHG | BODY MASS INDEX: 33.49 KG/M2 | HEART RATE: 69 BPM

## 2022-03-08 DIAGNOSIS — Z79.4 TYPE 2 DIABETES MELLITUS WITH HYPOGLYCEMIA WITHOUT COMA, WITH LONG-TERM CURRENT USE OF INSULIN (HCC): ICD-10-CM

## 2022-03-08 DIAGNOSIS — E11.649 TYPE 2 DIABETES MELLITUS WITH HYPOGLYCEMIA WITHOUT COMA, WITH LONG-TERM CURRENT USE OF INSULIN (HCC): ICD-10-CM

## 2022-03-08 DIAGNOSIS — E66.9 CLASS 1 OBESITY WITH SERIOUS COMORBIDITY AND BODY MASS INDEX (BMI) OF 33.0 TO 33.9 IN ADULT, UNSPECIFIED OBESITY TYPE: ICD-10-CM

## 2022-03-08 DIAGNOSIS — E78.00 PURE HYPERCHOLESTEROLEMIA: ICD-10-CM

## 2022-03-08 DIAGNOSIS — E05.90 HYPERTHYROIDISM: ICD-10-CM

## 2022-03-08 DIAGNOSIS — E27.9 LESION OF ADRENAL GLAND (HCC): ICD-10-CM

## 2022-03-08 DIAGNOSIS — Z79.4 TYPE 2 DIABETES MELLITUS WITH HYPERGLYCEMIA, WITH LONG-TERM CURRENT USE OF INSULIN (HCC): Primary | ICD-10-CM

## 2022-03-08 DIAGNOSIS — E11.65 TYPE 2 DIABETES MELLITUS WITH HYPERGLYCEMIA, WITH LONG-TERM CURRENT USE OF INSULIN (HCC): Primary | ICD-10-CM

## 2022-03-08 DIAGNOSIS — I10 PRIMARY HYPERTENSION: ICD-10-CM

## 2022-03-08 PROCEDURE — 99215 OFFICE O/P EST HI 40 MIN: CPT | Performed by: INTERNAL MEDICINE

## 2022-03-08 RX ORDER — DOCUSATE SODIUM 100 MG/1
CAPSULE, LIQUID FILLED ORAL
COMMUNITY
Start: 2022-02-01

## 2022-03-08 RX ORDER — INSULIN GLARGINE 100 [IU]/ML
INJECTION, SOLUTION SUBCUTANEOUS
COMMUNITY
End: 2022-03-08 | Stop reason: SDUPTHER

## 2022-03-08 RX ORDER — CEFUROXIME AXETIL 500 MG/1
TABLET ORAL
COMMUNITY
Start: 2022-01-28

## 2022-03-08 RX ORDER — DICYCLOMINE HCL 20 MG
TABLET ORAL
COMMUNITY
Start: 2022-02-01

## 2022-03-08 RX ORDER — INSULIN GLARGINE 100 [IU]/ML
20 INJECTION, SOLUTION SUBCUTANEOUS DAILY
Qty: 10 ML | Refills: 3 | Status: SHIPPED | OUTPATIENT
Start: 2022-03-08

## 2022-03-08 RX ORDER — METHIMAZOLE 5 MG/1
5 TABLET ORAL DAILY
Qty: 30 TABLET | Refills: 3 | Status: SHIPPED | OUTPATIENT
Start: 2022-03-08 | End: 2022-07-15 | Stop reason: SDUPTHER

## 2022-03-08 RX ORDER — INSULIN LISPRO 100 [IU]/ML
INJECTION, SOLUTION INTRAVENOUS; SUBCUTANEOUS
COMMUNITY
Start: 2022-02-10 | End: 2022-03-08 | Stop reason: SDUPTHER

## 2022-03-08 RX ORDER — ONDANSETRON 8 MG/1
8 TABLET, ORALLY DISINTEGRATING ORAL 2 TIMES DAILY PRN
COMMUNITY
Start: 2022-01-24

## 2022-03-08 RX ORDER — INSULIN GLARGINE 100 [IU]/ML
20 INJECTION, SOLUTION SUBCUTANEOUS DAILY
Qty: 10 ML | Refills: 3 | Status: SHIPPED | OUTPATIENT
Start: 2022-03-08 | End: 2022-03-08

## 2022-03-08 RX ORDER — INSULIN LISPRO 100 [IU]/ML
INJECTION, SOLUTION INTRAVENOUS; SUBCUTANEOUS
Qty: 15 ML | Refills: 3 | Status: SHIPPED | OUTPATIENT
Start: 2022-03-08

## 2022-03-08 RX ORDER — METHIMAZOLE 5 MG/1
5 TABLET ORAL DAILY
COMMUNITY
Start: 2022-01-28 | End: 2022-03-08 | Stop reason: SDUPTHER

## 2022-03-08 RX ORDER — SIMETHICONE 80 MG
TABLET,CHEWABLE ORAL
COMMUNITY
Start: 2022-01-27

## 2022-03-08 NOTE — PROGRESS NOTES
Oumou Robison 59 y o  female MRN: [id number]    Encounter: [id number]    Established pt progress note  Impression:   diabetes type 2 on insulin with no complications   incidental left adrenal adenoma   hyperthyroidism likely secondary to Graves disease   HTN,  HLD    Assessment: This is a 59y o -year-old female with pmhx of DM type 2 , left adrenal incidentiloma, HTN, HLD,  Primary hyperthyroidism vitamin D deficiency obesity, presents to the clinic for a f/u on DM type 2       DM type 2 on insulin with no complications  A9E: 7 6   patient was recently started on insulin by her PCP about 3 weeks ago patient admits that has not been compliant with Lantus and she uses Lantus every other day  - patient did not bring a glucose log recommend her to send us glucose log in 1-2 weeks and will make adjustments if needed  - prior to insulin regimen patient was taking Januvia and glimepiride however Januvia was recently discontinued by her PCP due to history of chronic pancreatitis  - Patient had an episode of hypoglycemia (62) during this office visit we recommended her to discontinue taking glimepiride and  we are going to prescribe Lantus 20 units qd,  and lispro 8 units before breakfast 8 units before lunch and 10 units before dinnertime     Primary hyperthyroidism   TSH:  0 31    T4:  2 1  - patient was recently diagnosed with hyperthyroidism by her PCP and she reports that she started taking methimazole 5 milligrams q day about 5 weeks ago  - thyroid ultrasound demonstrated subcentimeter multinodular goiter  - continue with methimazole 5 milligrams q daily  - We will order TSI ,TSH, T4-T3 and will make adjustments if needed  -pt showed thyroid US results and thyroid panel, in her cell phone ( no records in the system), requested to send us the records     left adrenal adenoma   CT abdomen in June 2021 demonstrated a stable 2 x 2 centimeters left adrenal adenoma   previous adrenal blood work has been unremarkable   will order 24 urine cortisol collection,  Metanephrines,  Aldosterone/renine   will order a CT abdomen to be done in June     hypertension:   patient takes lisinopril follow-up with PCP     hyperlipidemia:   patient takes atorvastatin follow with PCP    CC: Diabetes type 2 / hyperthyroidism/  Adrenal adenoma    HPI:    This is a  59  Years female with diabetes type 2  Hypertension, HLD, incidental left  adrenal adenoma presents to the clinic for a f/u on DM type 2    Patient denies polyuria polyphagia neuropathy,  Patient reports that  She has not been compliant with Lantus,  And has been checking her glucose levels almost every day 3 times per day    Family history : Yes siblings DM type 2  Opthamology: saw recently denies hx of retinopathy   Podiatry: Saw 1 year ago  Nutritionist: last year  Pancreatitis: Never  hospitalized for blood sugars:once, not recently  Home blood glucose monitoring: 3 times per day, AM fasting, before lunch, before dinner ranges around 95 and 200  Diet: 3 meals per day, breakfast: eggs, toast, cheese, lunch: yougurth, hotdogs, gatorade, dinner: brown rice, aniceto,s baked chicken, fish, turkey  Snacks: chocolates, chips, oreos,   diabetic diet compliance:  No  Activity: lift weights 3-4 times per week  Current regimen: Lantus 30 units AM, lispro 10 units before meals, glimpiride 2 mg qd  Hypoglycemic episodes:1 episode  Of 42    H/o of hypoglycemia causing hospitalization or Intervention such as glucagon injection  or ambulance call : no  Hypoglycemia symptoms: none   Diabetes education: No      on ACE inhibitor/ARB:Lisinopril  On statins:Atorvastatin   Medication complaince:Yes  Yes Side effect of medications:     Review of Systems   Constitutional: Negative for activity change and appetite change  HENT: Negative for congestion and facial swelling  Eyes: Negative for photophobia and discharge     Respiratory: Negative for apnea, shortness of breath and wheezing  Cardiovascular: Negative for chest pain and palpitations  Gastrointestinal: Negative for abdominal distention and abdominal pain  Endocrine: Negative for cold intolerance, heat intolerance and polydipsia  Musculoskeletal: Negative for arthralgias and back pain  Skin: Negative for color change and wound  Neurological: Negative for tremors, weakness and headaches  Psychiatric/Behavioral: Negative for agitation, behavioral problems and confusion         Historical Information   Past Medical History:   Diagnosis Date    Diabetes mellitus ([facility] [address] )     Hyperlipidemia     Hypertension      Past Surgical History:   Procedure Laterality Date    ROTATOR CUFF REPAIR       Social History   Social History     Substance and Sexual Activity   Alcohol Use Yes    Comment: socially     Social History     Substance and Sexual Activity   Drug Use No     Social History     Tobacco Use   Smoking Status Current Every Day Smoker    Packs/day: 0 50    Types: Cigarettes   Smokeless Tobacco Never Used     Family History:   Family History   Problem Relation Age of Onset    Lung cancer Father 52        smoker    Endometrial cancer Sister 61    Diabetes Mother     Heart failure Mother     Breast cancer Sister 58    No Known Problems Sister     No Known Problems Sister     No Known Problems Sister     No Known Problems Sister     No Known Problems Maternal Aunt     No Known Problems Maternal Aunt     No Known Problems Paternal Aunt        Meds/Allergies   Current Outpatient Medications   Medication Sig Dispense Refill    docusate sodium (COLACE) 100 mg capsule take 2 capsule by mouth once daily      glimepiride (AMARYL) 2 mg tablet Take one tablet by mouth daily 90 tablet 0    HumaLOG KwikPen 100 units/mL injection pen inject 10 unit subcutaneously every morning LUNCH AND DINNER      ibuprofen (MOTRIN) 800 mg tablet Take by mouth      insulin glargine (LANTUS) 100 units/mL subcutaneous injection Inject under the skin Inject 30 units under the skin once a day      Lancets (ONETOUCH ULTRASOFT) lancets USE TO TEST BLOOD SUGAR 2  TIMES A  each 0    Lancets (ONETOUCH ULTRASOFT) lancets       lisinopril (ZESTRIL) 20 mg tablet Take 20 mg by mouth daily  0    lisinopril (ZESTRIL) 20 mg tablet Take one tablet (20 mg) once a day 90 tablet 0    methimazole (TAPAZOLE) 5 mg tablet Take 5 mg by mouth daily      Multiple Vitamins-Minerals (CENTRUM SILVER 50+WOMEN) TABS Take by mouth      ONE TOUCH ULTRA TEST test strip       OneTouch Ultra test strip CHECK BLOOD SUGAR TWICE  DAILY 200 each 3    pantoprazole (PROTONIX) 40 mg tablet       simvastatin (ZOCOR) 20 mg tablet take 1 tablet by mouth at bedtime 90 tablet 1    amoxicillin-clavulanate (AUGMENTIN) 875-125 mg per tablet  (Patient not taking: Reported on 3/8/2022 )      buPROPion (ZYBAN) 150 MG 12 hr tablet Take 150 mg by mouth daily (Patient not taking: Reported on 3/8/2022 )  2    cefuroxime (CEFTIN) 500 mg tablet TAKE 1 TABLET EVERY 12 HOURS FOR 7 DAYS (Patient not taking: Reported on 3/8/2022)      cholecalciferol (VITAMIN D3) 1,000 units tablet take 1 tablet by mouth once daily (Patient not taking: Reported on 3/8/2022) 90 tablet 3    dexamethasone (DECADRON) 1 mg tablet Take tablet by mouth at 11PM and have cortisol lab done the next morning at 8AM (Patient not taking: Reported on 3/8/2022 ) 1 tablet 0    dicyclomine (BENTYL) 20 mg tablet take 1 tablet by mouth three times a day if needed for abdominal pain (Patient not taking: Reported on 3/8/2022)      fluconazole (DIFLUCAN) 150 mg tablet Take 150 mg by mouth daily (Patient not taking: Reported on 3/8/2022 )  0    fluconazole (DIFLUCAN) 200 mg tablet TAKE 1 TABLET BY MOUTH ONE TIME (Patient not taking: Reported on 3/8/2022)  0    glimepiride (AMARYL) 1 mg tablet Take 1 tablet in the morning with breakfast  E11 65 (Patient not taking: Reported on 3/8/2022 ) 90 tablet 3    IMVEXXY MAINTENANCE PACK 4 MCG INST  (Patient not taking: Reported on 3/8/2022 )      JANUVIA 100 MG tablet  (Patient not taking: Reported on 3/8/2022 )      Januvia 100 MG tablet TAKE 1 TABLET BY MOUTH  DAILY (Patient not taking: Reported on 4/12/2021) 90 tablet 3    Januvia 100 MG tablet TAKE 1 TABLET BY MOUTH  DAILY (Patient not taking: Reported on 3/8/2022) 90 tablet 0    lisinopril (ZESTRIL) 30 mg tablet Take by mouth (Patient not taking: Reported on 3/8/2022 )      ondansetron (ZOFRAN-ODT) 8 mg disintegrating tablet 8 mg 2 (two) times a day as needed (Patient not taking: Reported on 3/8/2022 )      PROAIR  (90 Base) MCG/ACT inhaler Inhale 2 puffs 2 (two) times a day (Patient not taking: Reported on 3/8/2022 )  2    RABEprazole (ACIPHEX) 20 MG tablet  (Patient not taking: Reported on 3/8/2022 )      simethicone (MYLICON) 80 mg chewable tablet chew and swallow 1 tablet four times a day if needed (Patient not taking: Reported on 3/8/2022)       No current facility-administered medications for this visit  No Known Allergies    Objective   Vitals: Blood pressure 130/80, pulse 69, temperature (!) 97 °F (36 1 °C), height 5' 2" (1 575 m), weight 82 6 kg (182 lb)  Physical Exam  Constitutional:       Appearance: Normal appearance  HENT:      Head: Normocephalic and atraumatic  Comments: Small thyroid nodule R side     Nose: No congestion or rhinorrhea  Mouth/Throat:      Pharynx: No oropharyngeal exudate or posterior oropharyngeal erythema  Eyes:      Conjunctiva/sclera: Conjunctivae normal       Pupils: Pupils are equal, round, and reactive to light  Cardiovascular:      Rate and Rhythm: Normal rate and regular rhythm  Pulses: Normal pulses  Dorsalis pedis pulses are 2+ on the right side and 2+ on the left side  Heart sounds: No murmur heard  No friction rub  Pulmonary:      Effort: No respiratory distress  Breath sounds: No stridor  No wheezing     Abdominal: General: There is no distension  Palpations: Abdomen is soft  Tenderness: There is no abdominal tenderness  Musculoskeletal:         General: No swelling  Cervical back: No rigidity or tenderness  Feet:      Right foot:      Skin integrity: No ulcer, erythema or callus  Left foot:      Skin integrity: No ulcer, erythema or callus  Skin:     Coloration: Skin is not jaundiced  Findings: No bruising  Neurological:      General: No focal deficit present  Mental Status: She is alert and oriented to person, place, and time  Motor: No weakness  Psychiatric:         Mood and Affect: Mood normal          Thought Content: Thought content normal          Judgment: Judgment normal        Diabetic Foot Exam    Right Foot/Ankle   Right Foot Inspection  Skin Exam: No callus, no erythema, no ulcer and no callus  Sensory   Vibration: intact  Monofilament testing: intact    Vascular  The right DP pulse is 2+  Left Foot/Ankle  Left Foot Inspection  Skin Exam: No erythema, no ulcer and no callus  Sensory   Vibration: intact  Monofilament testing: intact    Vascular  The left DP pulse is 2+  Assign Risk Category  No deformity present  Risk: 0      Lab Results:   Lab Results   Component Value Date/Time    Hemoglobin A1C 7 6 (H) 04/19/2021 12:00 AM    BUN 15 04/19/2021 12:00 AM    Potassium 4 4 04/19/2021 12:00 AM    Chloride 101 04/19/2021 12:00 AM    CO2 25 04/19/2021 12:00 AM    Creatinine 0 81 04/19/2021 12:00 AM    AST 15 04/19/2021 12:00 AM    ALT 17 04/19/2021 12:00 AM    Albumin 4 4 04/19/2021 12:00 AM    Globulin, Total 2 7 04/19/2021 12:00 AM           Portions of the record may have been created with voice recognition software  Occasional wrong word or "sound a like" substitutions may have occurred due to the inherent limitations of voice recognition software  Read the chart carefully and recognize, using context, where substitutions have occurred

## 2022-03-08 NOTE — PATIENT INSTRUCTIONS
Use Lantus 20 units once a day  And Humalog 8 units before brekfast 8 units before lunch and 10 units before dinner  Discontinue glimepiride

## 2022-03-21 ENCOUNTER — TELEPHONE (OUTPATIENT)
Dept: ENDOCRINOLOGY | Facility: CLINIC | Age: 62
End: 2022-03-21

## 2022-03-21 NOTE — TELEPHONE ENCOUNTER
Waiting for approval through [id number] for [facility] [address] Case #[id number]

## 2022-04-01 DIAGNOSIS — E27.9 LESION OF ADRENAL GLAND (HCC): Primary | ICD-10-CM

## 2022-04-01 NOTE — TELEPHONE ENCOUNTER
Spoke to pt, she stated she received letter CT was approved  Dr Andrea Blanco advised to have pt reschedule the CT for June/July and not for April  And also she placed a new order    advised pt to let scheduling know to schedule for order that placed today (4/1/22)

## 2022-04-06 DIAGNOSIS — E27.9 LESION OF ADRENAL GLAND (HCC): Primary | ICD-10-CM

## 2022-04-06 NOTE — TELEPHONE ENCOUNTER
Please check to see if I ordered the correct labs to be done before the CT scan    BUN and creatinine

## 2022-05-03 DIAGNOSIS — E11.65 TYPE 2 DIABETES MELLITUS WITH HYPERGLYCEMIA, WITH LONG-TERM CURRENT USE OF INSULIN (HCC): ICD-10-CM

## 2022-05-03 DIAGNOSIS — Z79.4 TYPE 2 DIABETES MELLITUS WITH HYPERGLYCEMIA, WITH LONG-TERM CURRENT USE OF INSULIN (HCC): ICD-10-CM

## 2022-05-03 RX ORDER — INSULIN GLARGINE 100 [IU]/ML
INJECTION, SOLUTION SUBCUTANEOUS
Qty: 15 ML | Refills: 3 | Status: SHIPPED | OUTPATIENT
Start: 2022-05-03 | End: 2022-07-21 | Stop reason: SDUPTHER

## 2022-05-26 LAB — HBA1C MFR BLD HPLC: 6.9 %

## 2022-06-14 ENCOUNTER — OFFICE VISIT (OUTPATIENT)
Dept: ENDOCRINOLOGY | Facility: CLINIC | Age: 62
End: 2022-06-14
Payer: COMMERCIAL

## 2022-06-14 VITALS
BODY MASS INDEX: 32.79 KG/M2 | SYSTOLIC BLOOD PRESSURE: 130 MMHG | DIASTOLIC BLOOD PRESSURE: 76 MMHG | WEIGHT: 178.2 LBS | HEIGHT: 62 IN

## 2022-06-14 DIAGNOSIS — E66.9 CLASS 1 OBESITY WITH SERIOUS COMORBIDITY AND BODY MASS INDEX (BMI) OF 32.0 TO 32.9 IN ADULT, UNSPECIFIED OBESITY TYPE: ICD-10-CM

## 2022-06-14 DIAGNOSIS — E11.9 TYPE 2 DIABETES MELLITUS WITHOUT COMPLICATION, WITH LONG-TERM CURRENT USE OF INSULIN (HCC): Primary | ICD-10-CM

## 2022-06-14 DIAGNOSIS — I10 PRIMARY HYPERTENSION: ICD-10-CM

## 2022-06-14 DIAGNOSIS — E05.90 HYPERTHYROIDISM: ICD-10-CM

## 2022-06-14 DIAGNOSIS — E27.9 LESION OF ADRENAL GLAND (HCC): ICD-10-CM

## 2022-06-14 DIAGNOSIS — Z79.4 TYPE 2 DIABETES MELLITUS WITHOUT COMPLICATION, WITH LONG-TERM CURRENT USE OF INSULIN (HCC): Primary | ICD-10-CM

## 2022-06-14 DIAGNOSIS — E78.00 PURE HYPERCHOLESTEROLEMIA: ICD-10-CM

## 2022-06-14 PROCEDURE — 99215 OFFICE O/P EST HI 40 MIN: CPT | Performed by: INTERNAL MEDICINE

## 2022-06-14 RX ORDER — AMLODIPINE BESYLATE 10 MG/1
10 TABLET ORAL DAILY
COMMUNITY
Start: 2022-05-25

## 2022-06-14 RX ORDER — PEN NEEDLE, DIABETIC 32GX 5/32"
NEEDLE, DISPOSABLE MISCELLANEOUS 4 TIMES DAILY
Qty: 400 EACH | Refills: 3 | Status: SHIPPED | OUTPATIENT
Start: 2022-06-14

## 2022-06-14 NOTE — PROGRESS NOTES
Wen Crowley 59 y o  female MRN: [id number]    Encounter: [id number]      Assessment/Plan     1  Type 2 diabetes mellitus on long-term insulin therapy  2  Obesity  Well controlled with A1c 6 9%, continue current regimen   Repeat labs in 3 months     3  Hyperthyroidism  4  Thyroid nodules   TSH, free T4 within normal limits, T3 not checked   -check TRAb antibody   Continue methimazole at current dose   Recommend FNA biopsy 1 9 cm right lower pole nodule, would also consider biopsy of right lower pole nodule 10 X 4 X 10 mm which meets criteria per MOR guidelines  Patient says she will discuss this with her primary care physician     5   Adrenal nodule  -CT adrenals as scheduled  -advised patient to have labs as ordered for hormonal workup      I have spent 40 minutes with Patient  today in which greater than 50% of this time was spent in counseling/coordination of care    CC:  Diabetes mellitus , adrenal nodule    History of Present Illness     HPI:  Wen Crowley is a 59 y o  female who is here for a follow-up of type 2 diabetes mellitus, left adrenal adenoma, hyperthyroidism  Questionable history of pancreatitis, had elevated lipase levels    Last seen in 2022     Type 2 diabetes mellitus  Last eye exam - Last 6 months - No DR per patient     Currently on the following  Lantus 20 units subcutaneously in the morning   NovoLog 5 units with meals 3 times a day    Taking simvastatin, lisinopril     Hyperthyroidism-methimazole 5 mg orally daily    Thyroid nodules-ultrasound report reviewed, largest 1 9 cm right lower pole, hypoechoic nodule, meets criteria for FNA     Left adrenal adenoma - CT  Adrenals scheduled for 22     Feels her appetite is more   Trying to eat better   Walking regularly   Lost 4  Lbs   No shakes/ tremors  Occasional palpitations   No diarrhea/ constipation   No swelling it the neck   No flushing/ headaches, occasional anxiousness     On recall:   Fastin-140, lowest 60 mg/dl - not recent  Pre-dinner:  130-140  Hypoglycemia Swaeting, shaky, sweaty     All other systems were reviewed and are negative         Review of Systems    Historical Information   Past Medical History:   Diagnosis Date    Diabetes mellitus ([address] )     Hyperlipidemia     Hypertension      Past Surgical History:   Procedure Laterality Date    ROTATOR CUFF REPAIR       Social History   Social History     Substance and Sexual Activity   Alcohol Use Yes    Comment: socially     Social History     Substance and Sexual Activity   Drug Use No     Social History     Tobacco Use   Smoking Status Current Every Day Smoker    Packs/day: 0 50    Types: Cigarettes   Smokeless Tobacco Never Used     Family History:   Family History   Problem Relation Age of Onset    Lung cancer Father 52        smoker    Endometrial cancer Sister 61    Diabetes Mother     Heart failure Mother     Breast cancer Sister 58    No Known Problems Sister     No Known Problems Sister     No Known Problems Sister     No Known Problems Sister     No Known Problems Maternal Aunt     No Known Problems Maternal Aunt     No Known Problems Paternal Aunt        Meds/Allergies   Current Outpatient Medications   Medication Sig Dispense Refill    HumaLOG KwikPen 100 units/mL injection pen Use 8 units before breakfast, 8 units before lunch and 10 units before dinner 15 mL 3    ibuprofen (MOTRIN) 800 mg tablet Take by mouth as needed      insulin glargine (Lantus SoloStar) 100 units/mL injection pen Inject 20 units under the skin every morning 15 mL 3    Lancets (ONETOUCH ULTRASOFT) lancets USE TO TEST BLOOD SUGAR 2  TIMES A  each 0    lisinopril (ZESTRIL) 20 mg tablet Take 20 mg by mouth daily  0    methimazole (TAPAZOLE) 5 mg tablet Take 1 tablet (5 mg total) by mouth daily 30 tablet 3    Multiple Vitamins-Minerals (CENTRUM SILVER 50+WOMEN) TABS Take by mouth      ONE TOUCH ULTRA TEST test strip       simvastatin (ZOCOR) 20 mg tablet take 1 tablet by mouth at bedtime 90 tablet 1    amLODIPine (NORVASC) 10 mg tablet Take 10 mg by mouth daily (Patient not taking: Reported on 6/14/2022)      amoxicillin-clavulanate (AUGMENTIN) 875-125 mg per tablet  (Patient not taking: No sig reported)      buPROPion (ZYBAN) 150 MG 12 hr tablet Take 150 mg by mouth daily (Patient not taking: No sig reported)  2    cefuroxime (CEFTIN) 500 mg tablet TAKE 1 TABLET EVERY 12 HOURS FOR 7 DAYS (Patient not taking: No sig reported)      cholecalciferol (VITAMIN D3) 1,000 units tablet take 1 tablet by mouth once daily (Patient not taking: No sig reported) 90 tablet 3    dexamethasone (DECADRON) 1 mg tablet Take tablet by mouth at 11PM and have cortisol lab done the next morning at 8AM (Patient not taking: No sig reported) 1 tablet 0    dicyclomine (BENTYL) 20 mg tablet take 1 tablet by mouth three times a day if needed for abdominal pain (Patient not taking: No sig reported)      docusate sodium (COLACE) 100 mg capsule take 2 capsule by mouth once daily (Patient not taking: Reported on 6/14/2022)      fluconazole (DIFLUCAN) 150 mg tablet Take 150 mg by mouth daily (Patient not taking: No sig reported)  0    fluconazole (DIFLUCAN) 200 mg tablet TAKE 1 TABLET BY MOUTH ONE TIME (Patient not taking: No sig reported)  0    glimepiride (AMARYL) 1 mg tablet Take 1 tablet in the morning with breakfast  E11 65 (Patient not taking: No sig reported) 90 tablet 3    IMVEXXY MAINTENANCE PACK 4 MCG INST  (Patient not taking: No sig reported)      insulin glargine (LANTUS) 100 units/mL subcutaneous injection Inject 20 Units under the skin daily Inject 20 units under the skin once a day (Patient not taking: Reported on 6/14/2022) 10 mL 3    JANUVIA 100 MG tablet  (Patient not taking: No sig reported)      Januvia 100 MG tablet TAKE 1 TABLET BY MOUTH  DAILY (Patient not taking: No sig reported) 90 tablet 3    Januvia 100 MG tablet TAKE 1 TABLET BY MOUTH  DAILY (Patient not taking: No sig reported) 90 tablet 0    Lancets (ONETOUCH ULTRASOFT) lancets  (Patient not taking: Reported on 6/14/2022)      lisinopril (ZESTRIL) 20 mg tablet Take one tablet (20 mg) once a day (Patient not taking: Reported on 6/14/2022) 90 tablet 0    lisinopril (ZESTRIL) 30 mg tablet Take by mouth (Patient not taking: No sig reported)      ondansetron (ZOFRAN-ODT) 8 mg disintegrating tablet 8 mg 2 (two) times a day as needed (Patient not taking: No sig reported)      OneTouch Ultra test strip CHECK BLOOD SUGAR TWICE  DAILY (Patient not taking: Reported on 6/14/2022) 200 each 3    pantoprazole (PROTONIX) 40 mg tablet  (Patient not taking: Reported on 6/14/2022)      PROAIR  (90 Base) MCG/ACT inhaler Inhale 2 puffs 2 (two) times a day (Patient not taking: No sig reported)  2    RABEprazole (ACIPHEX) 20 MG tablet  (Patient not taking: No sig reported)      simethicone (MYLICON) 80 mg chewable tablet chew and swallow 1 tablet four times a day if needed (Patient not taking: No sig reported)       No current facility-administered medications for this visit  No Known Allergies    Objective   Vitals: Blood pressure 130/76, height 5' 2" (1 575 m), weight 80 8 kg (178 lb 3 2 oz)  Physical Exam  Constitutional:       Appearance: She is well-developed  HENT:      Head: Normocephalic and atraumatic  Eyes:      Conjunctiva/sclera: Conjunctivae normal       Pupils: Pupils are equal, round, and reactive to light  Neck:      Thyroid: No thyromegaly  Cardiovascular:      Rate and Rhythm: Normal rate and regular rhythm  Heart sounds: Normal heart sounds  No murmur heard  Pulmonary:      Effort: Pulmonary effort is normal       Breath sounds: Normal breath sounds  No wheezing  Abdominal:      General: There is no distension  Palpations: Abdomen is soft  Tenderness: There is no abdominal tenderness  Musculoskeletal:         General: Normal range of motion        Cervical back: Normal range of motion and neck supple  Skin:     General: Skin is warm and dry  Neurological:      Mental Status: She is alert and oriented to person, place, and time  Deep Tendon Reflexes: Reflexes normal       Comments: No tremors on the outstretched arms     Psychiatric:         Behavior: Behavior normal          The history was obtained from the review of the chart, patient  Lab Results:      Lab Results   Component Value Date    WBC 10 4 09/18/2019    HGB 15 2 09/18/2019    HCT 44 9 09/18/2019    MCV 85 09/18/2019     09/18/2019     Lab Results   Component Value Date    CREATININE 0 81 04/19/2021    BUN 15 04/19/2021    K 4 4 04/19/2021     04/19/2021    CO2 25 04/19/2021     Lab Results   Component Value Date    HGBA1C 7 6 (H) 04/19/2021         Imaging Studies:       I have personally reviewed pertinent reports  Portions of the record may have been created with voice recognition software  Occasional wrong word or "sound a like" substitutions may have occurred due to the inherent limitations of voice recognition software  Read the chart carefully and recognize, using context, where substitutions have occurred

## 2022-07-01 ENCOUNTER — TELEPHONE (OUTPATIENT)
Dept: ENDOCRINOLOGY | Facility: CLINIC | Age: 62
End: 2022-07-01

## 2022-07-01 NOTE — TELEPHONE ENCOUNTER
Spoke with Neyda regarding prior auth for CPT [id number]  CT abdomen- No auth required Case #[id number]

## 2022-07-02 LAB
ALDOST SERPL-MCNC: 5.2 NG/DL (ref 0–30)
ALDOST/RENIN PLAS-RTO: 4.7 {RATIO} (ref 0–30)
DHEA-S SERPL-MCNC: 108 UG/DL (ref 29.4–220.5)
DOPAMINE 24H UR-MRATE: <30 PG/ML (ref 0–48)
EPINEPH PLAS-MCNC: 68 PG/ML (ref 0–62)
METANEPH FREE SERPL-MCNC: 61.2 PG/ML (ref 0–88)
NOREPINEPH PLAS-MCNC: 499 PG/ML (ref 0–874)
NORMETANEPHRINE SERPL-MCNC: 179.9 PG/ML (ref 0–285.2)
RENIN PLAS-CCNC: 1.1 NG/ML/HR (ref 0.17–5.38)
TSH RECEP AB SER-ACNC: 1.44 IU/L (ref 0–1.75)

## 2022-07-05 ENCOUNTER — TELEPHONE (OUTPATIENT)
Dept: ENDOCRINOLOGY | Facility: CLINIC | Age: 62
End: 2022-07-05

## 2022-07-05 NOTE — TELEPHONE ENCOUNTER
----- Message from Johnnie Madison MD sent at 7/5/2022  9:13 AM EDT -----  Plasma epinephrine level just above upper limit of normal, not significant     Rest of the adrenal labs within normal limits

## 2022-07-06 LAB — BUN SERPL-MCNC: 7 MG/DL (ref 8–27)

## 2022-07-13 ENCOUNTER — HOSPITAL ENCOUNTER (OUTPATIENT)
Dept: RADIOLOGY | Facility: HOSPITAL | Age: 62
Discharge: HOME | End: 2022-07-13

## 2022-07-13 ENCOUNTER — TELEPHONE (OUTPATIENT)
Dept: ENDOCRINOLOGY | Facility: CLINIC | Age: 62
End: 2022-07-13

## 2022-07-13 ENCOUNTER — HOSPITAL ENCOUNTER (OUTPATIENT)
Dept: RADIOLOGY | Facility: HOSPITAL | Age: 62
Discharge: HOME | End: 2022-07-13
Payer: COMMERCIAL

## 2022-07-13 DIAGNOSIS — R92.8 ABNORMAL MAMMOGRAM: ICD-10-CM

## 2022-07-13 DIAGNOSIS — N63.0 BREAST MASS IN FEMALE: ICD-10-CM

## 2022-07-13 PROCEDURE — 76642 ULTRASOUND BREAST LIMITED: CPT

## 2022-07-13 NOTE — TELEPHONE ENCOUNTER
patient stopped in office requesting results for BUN  Also she would like to know if she will need thyroid labs completed?

## 2022-07-14 ENCOUNTER — TELEPHONE (OUTPATIENT)
Dept: ENDOCRINOLOGY | Facility: CLINIC | Age: 62
End: 2022-07-14

## 2022-07-14 NOTE — TELEPHONE ENCOUNTER
Did prior auth for CPT 91718 CT of Abdomen    Case #[id number]- Faxed imaging and office notes to [id number] [facility] at [phone]

## 2022-07-15 DIAGNOSIS — E05.90 HYPERTHYROIDISM: ICD-10-CM

## 2022-07-15 DIAGNOSIS — E05.90 HYPERTHYROIDISM: Primary | ICD-10-CM

## 2022-07-15 RX ORDER — METHIMAZOLE 5 MG/1
5 TABLET ORAL DAILY
Qty: 30 TABLET | Refills: 3 | Status: SHIPPED | OUTPATIENT
Start: 2022-07-15

## 2022-07-18 NOTE — TELEPHONE ENCOUNTER
Prior auth for CT of Abdomen [address]75 has been approved  Auth #: [id number]  Valid: 7/18/2022 through 1/14/2023  Approval letter attached

## 2022-07-19 ENCOUNTER — HOSPITAL ENCOUNTER (OUTPATIENT)
Dept: RADIOLOGY | Facility: HOSPITAL | Age: 62
Discharge: HOME | End: 2022-07-19
Attending: INTERNAL MEDICINE
Payer: COMMERCIAL

## 2022-07-19 DIAGNOSIS — E27.9 LESION OF ADRENAL GLAND (HCC): ICD-10-CM

## 2022-07-19 PROCEDURE — 74170 CT ABD WO CNTRST FLWD CNTRST: CPT

## 2022-07-19 PROCEDURE — G1004 CDSM NDSC: HCPCS

## 2022-07-19 RX ADMIN — IOHEXOL 65 ML: 350 INJECTION, SOLUTION INTRAVENOUS at 14:12

## 2022-07-21 DIAGNOSIS — Z79.4 TYPE 2 DIABETES MELLITUS WITH HYPERGLYCEMIA, WITH LONG-TERM CURRENT USE OF INSULIN (HCC): ICD-10-CM

## 2022-07-21 DIAGNOSIS — E11.65 TYPE 2 DIABETES MELLITUS WITH HYPERGLYCEMIA, WITH LONG-TERM CURRENT USE OF INSULIN (HCC): ICD-10-CM

## 2022-07-21 RX ORDER — INSULIN GLARGINE 100 [IU]/ML
INJECTION, SOLUTION SUBCUTANEOUS
Qty: 15 ML | Refills: 3 | Status: SHIPPED | OUTPATIENT
Start: 2022-07-21

## 2022-07-26 LAB
T3 SERPL-MCNC: 91 NG/DL (ref 71–180)
T4 FREE SERPL-MCNC: 1.44 NG/DL (ref 0.82–1.77)
TSH SERPL DL<=0.005 MIU/L-ACNC: 1.83 UIU/ML (ref 0.45–4.5)

## 2023-03-26 DIAGNOSIS — E05.90 HYPERTHYROIDISM: ICD-10-CM

## 2023-03-27 RX ORDER — METHIMAZOLE 5 MG/1
TABLET ORAL
Qty: 30 TABLET | Refills: 3 | Status: SHIPPED | OUTPATIENT
Start: 2023-03-27

## 2023-08-28 DIAGNOSIS — E05.90 HYPERTHYROIDISM: ICD-10-CM

## 2023-08-28 RX ORDER — METHIMAZOLE 5 MG/1
TABLET ORAL
Qty: 30 TABLET | Refills: 3 | OUTPATIENT
Start: 2023-08-28

## 2023-08-31 DIAGNOSIS — E05.90 HYPERTHYROIDISM: ICD-10-CM

## 2023-09-01 RX ORDER — METHIMAZOLE 5 MG/1
TABLET ORAL
Qty: 30 TABLET | Refills: 3 | OUTPATIENT
Start: 2023-09-01